# Patient Record
Sex: MALE | Race: WHITE | NOT HISPANIC OR LATINO | Employment: OTHER | URBAN - METROPOLITAN AREA
[De-identification: names, ages, dates, MRNs, and addresses within clinical notes are randomized per-mention and may not be internally consistent; named-entity substitution may affect disease eponyms.]

---

## 2017-03-28 ENCOUNTER — HOSPITAL ENCOUNTER (OUTPATIENT)
Dept: RADIOLOGY | Facility: MEDICAL CENTER | Age: 72
End: 2017-03-28

## 2017-03-28 ENCOUNTER — OFFICE VISIT (OUTPATIENT)
Dept: PULMONOLOGY | Facility: HOSPICE | Age: 72
End: 2017-03-28
Payer: MEDICARE

## 2017-03-28 VITALS
DIASTOLIC BLOOD PRESSURE: 56 MMHG | SYSTOLIC BLOOD PRESSURE: 110 MMHG | RESPIRATION RATE: 13 BRPM | BODY MASS INDEX: 28.58 KG/M2 | WEIGHT: 211 LBS | HEART RATE: 73 BPM | TEMPERATURE: 97.2 F | HEIGHT: 72 IN

## 2017-03-28 DIAGNOSIS — R06.02 SOB (SHORTNESS OF BREATH): ICD-10-CM

## 2017-03-28 PROCEDURE — G8417 CALC BMI ABV UP PARAM F/U: HCPCS | Performed by: NURSE PRACTITIONER

## 2017-03-28 PROCEDURE — 1101F PT FALLS ASSESS-DOCD LE1/YR: CPT | Mod: 8P | Performed by: NURSE PRACTITIONER

## 2017-03-28 PROCEDURE — 99213 OFFICE O/P EST LOW 20 MIN: CPT | Performed by: NURSE PRACTITIONER

## 2017-03-28 PROCEDURE — 1036F TOBACCO NON-USER: CPT | Performed by: NURSE PRACTITIONER

## 2017-03-28 PROCEDURE — G8432 DEP SCR NOT DOC, RNG: HCPCS | Performed by: NURSE PRACTITIONER

## 2017-03-28 PROCEDURE — 4040F PNEUMOC VAC/ADMIN/RCVD: CPT | Mod: 8P | Performed by: NURSE PRACTITIONER

## 2017-03-28 PROCEDURE — G8484 FLU IMMUNIZE NO ADMIN: HCPCS | Performed by: NURSE PRACTITIONER

## 2017-03-28 PROCEDURE — 3017F COLORECTAL CA SCREEN DOC REV: CPT | Mod: 8P | Performed by: NURSE PRACTITIONER

## 2017-03-28 RX ORDER — TERAZOSIN 1 MG/1
1 CAPSULE ORAL NIGHTLY
COMMUNITY
End: 2018-05-02

## 2017-03-28 RX ORDER — CLOPIDOGREL BISULFATE 75 MG/1
75 TABLET ORAL DAILY
COMMUNITY

## 2017-03-28 RX ORDER — ONDANSETRON 4 MG/1
8 TABLET, ORALLY DISINTEGRATING ORAL EVERY 8 HOURS PRN
COMMUNITY

## 2017-03-28 RX ORDER — POTASSIUM CHLORIDE 750 MG/1
10 CAPSULE, EXTENDED RELEASE ORAL 2 TIMES DAILY
COMMUNITY

## 2017-03-28 NOTE — PATIENT INSTRUCTIONS
1.  CTA of chest due to increasing shortness of breath and chest pain.  2. Continue Spiriva daily and pro-air as needed.  3. Patient to follow-up with results.  4. Patient is strongly encouraged to discontinue smoking completely as his pulmonary function tests are still completely normal at this point.

## 2017-03-28 NOTE — PROGRESS NOTES
Chief Complaint   Patient presents with   • Follow-Up     PCP Itzel wants us to investigate his Chest Pain         HPI:  This is a 71 y.o. male with a history of obstructive sleep apnea.  Polysomnogram indicates AHI 15.3 and minimum saturation 85%. The patient is compliant with BiPAP 18/17 cm. Patient reports using his BiPAP nightly for 8-10 hours. He is well rested. He has no problems tolerating the mask or pressure.    Previous pulmonary function tests indicate FEV1 3.19 L, 100% predicted with 10% postbronchodilator response, FEV1/FVC 74%, and DLCO 136% predicted. The patient is compliant with Spiriva daily and pro-air as needed. He's been using his pro-air more often with improvement to his shortness of breath. Patient has a history of tuberculosis and underwent treatment in 1954. He is an every day smoker. He has been on Chantix for all for 2 years. Previous CT from July 2016 showed no acute cardiopulmonary process, however his chest pain and shortness of breath has worsened since then.    Patient was hospitalized October 2016 for shortness of breath and chest pain. The pain was to the right of the sternum and radiated down the right and left arm. Troponins were negative and patient had a negative stress test. Patient is here today to follow up for his chest pain. Primary care's taking a CT may be warranted. Patient's oxygenation is 95%. Patient reports his oxygen saturations are typically 94-98 percent. He has been more short of breath since October. The shortness of breath is consistently worse. The patient had a stent placed in October 2016.    Past Medical History   Diagnosis Date   • Arthritis    • Hypertension    • Congestive heart failure (CMS-HCC)    • Pain      back,legs,arms,pain scale 8   • Dental disorder      dentures   • Snoring    • Psychiatric problem    • Breath shortness    • Pacemaker    • Sleep apnea      uses bypap   • Pneumonia 2004   • Tuberculosis      treated for as a teenager   •  "Diabetes      on oral meds   • Anesthesia      CONFUSED/FIGHTING FOR 10 DAYS AFTER SURGERY   • JONATHAN treated with BiPAP 4/27/2016   • Shortness of breath 8/11/2016       Past Surgical History   Procedure Laterality Date   • Other cardiac surgery  1/22/97     quadruple bypass   • Hip arthroplasty total  10/6/2006     right   • Rotator cuff repair  12/8/06,10/6/07     bilateral,with muscle repair   • Lipoma excision  2005   • Pr repair of jessiertoe,one  2001     right foot   # 2,3,4,5   • Lumbar decompression  1/29/2010     Performed by IRAM PENALOZA at SURGERY [facility]   • Lumbar laminectomy diskectomy  1/29/2010     Performed by IRAM PENALOZA at SURGERY [facility]   • Foraminotomy  1/29/2010     Performed by IRAM PENALOZA at SURGERY [facility]   • Recovery  11/30/2010     Performed by MICHELLE, CATH-RECOVERY at SURGERY SAME DAY [facility]S   • Carpal tunnel release  4/18/2013     Performed by Iram Penaloza M.D. at SURGERY [facility]   • Carpal tunnel release  5/2/2013     Performed by Iram Penaloza M.D. at SURGERY [facility]   • Stent placement  10/28/19       Social History   Substance Use Topics   • Smoking status: Former Smoker -- 3.00 packs/day for 44 years     Types: Cigarettes     Quit date: 01/12/2008   • Smokeless tobacco: Former User     Types: Chew     Quit date: 01/01/1975      Comment: 3 ppd 45 yrs,quit 8/31/08   • Alcohol Use: No       ROS:   Constitutional: Denies fevers, chills, sweats, fatigue, and weight loss.  Eyes: Denies glasses.  Ears/nose/mouth/throat: Denies injury.  Cardiovascular: Denies chest pain, tightness.  Respiratory: Denies shortness of breath, cough, sputum, wheezing, hemoptysis.  GI: Denies heartburn, difficulty swallowing, nausea, and vomiting.  Neurological: Denies frequent headaches, dizziness, weakness.  Sleep: See history of present illness.    Vitals:  Filed Vitals:    03/28/17 0959   Height: 1.829 m (6' 0.01\")   Weight: 95.709 kg (211 lb) "   Weight % change since last entry.: 0 %   BP: 110/56   Pulse: 73   BMI (Calculated): 28.61   Resp: 13   Temp: 36.2 °C (97.2 °F)   TempSrc: Temporal   O2 sat % room air: 95 %     Allergies:  Tape and Valium    Medications.  Current Outpatient Prescriptions   Medication Sig Dispense Refill   • potassium chloride (MICRO-K) 10 MEQ capsule Take 10 mEq by mouth 2 times a day.     • clopidogrel (PLAVIX) 75 MG Tab Take 75 mg by mouth every day.     • terazosin (HYTRIN) 1 MG Cap Take 1 mg by mouth every evening.     • ondansetron (ZOFRAN ODT) 4 MG TABLET DISPERSIBLE Take 4 mg by mouth every 8 hours as needed for Nausea/Vomiting.     • econazole nitrate 1 % cream Apply  to affected area(s) 2 times a day.     • Diclofenac Sodium 1 % Cream Apply  to skin as directed.     • ipratropium (ATROVENT) 0.03 % Solution Spray 2 Sprays in nose 3 times a day. 3 Bottle 1   • isosorbide mononitrate SR (IMDUR) 30 MG TABLET SR 24 HR Take 1 Tab by mouth every day. 30 Tab 2   • metoprolol SR (TOPROL XL) 25 MG TABLET SR 24 HR Take 1 Tab by mouth every day. 30 Tab 2   • dutasteride (AVODART) 0.5 MG capsule Take 0.5 mg by mouth every day.     • fenofibrate (TRICOR) 145 MG Tab Take 145 mg by mouth every bedtime.     • rosuvastatin (CRESTOR) 10 MG Tab Take 10 mg by mouth every evening.     • donepezil (ARICEPT) 10 MG tablet Take 10 mg by mouth every bedtime.     • Memantine HCl ER (NAMENDA XR) 21 MG CAPSULE SR 24 HR Take 21 mg by mouth every bedtime.     • fluoxetine (PROZAC) 20 MG Cap Take 40 mg by mouth every bedtime.     • metformin (GLUCOPHAGE) 500 MG Tab Take 500 mg by mouth 2 times a day, with meals.     • baclofen (LIORESAL) 20 MG tablet Take 20-40 mg by mouth See Admin Instructions. 20 mg in AM  40 mg at BEDTIME     • Buprenorphine (BUTRANS) 10 MCG/HR PATCH WEEKLY Apply 15 mcg to skin as directed every Sunday.     • fluticasone (FLONASE) 50 MCG/ACT nasal spray Spray 1 Spray in nose every day.     • albuterol 108 (90 BASE) MCG/ACT Aero Soln  inhalation aerosol Inhale 2 Puffs by mouth every four hours as needed for Shortness of Breath.     • tiotropium (SPIRIVA HANDIHALER) 18 MCG Cap Inhale 18 mcg by mouth every day.     • omeprazole (PRILOSEC) 20 MG delayed-release capsule Take 20 mg by mouth 2 times a day.     • aspirin EC (ECOTRIN) 81 MG Tablet Delayed Response Take 81 mg by mouth every day.     • B Complex Vitamins (VITAMIN B COMPLEX PO) Take 1 Tab by mouth every day.     • nitroglycerin (NITROSTAT) 0.4 MG SL Tab Place 1 Tab under tongue as needed for Chest Pain (take every 5 minutes x 3 for chest pain, call 911 if not resolved or continued chest pain). 25 Tab 2   • Omega-3 Fatty Acids (FISH OIL) 1000 MG Cap capsule Take 1,000 mg by mouth every day.     • Potassium Gluconate 550 MG Tab Take 1 Tab by mouth every day.       No current facility-administered medications for this visit.       PHYSICAL EXAM:  Appearance: Well-developed, well-nourished, no acute distress.  Eyes. PERRL.  Hearing: Grossly intact.  Oropharynx: Tongue normal, posterior pharynx without erythema or exudate.  Respiratory effort: No intercostal retractions or use of accessory muscles.  Lung auscultation: No crackles, wheezing.  Heart auscultation: No murmur, gallop, or rub. Regular rate and rhythm.  Extremities: No cyanosis or edema.  Gait and Station: Normal  Orientation: Oriented to time, place, and person.    Assessment:  1. SOB (shortness of breath)  CT-CTA CHEST PULMONARY ARTERY W/ RECONS    BUN+CREAT         Plan:  1.  CTA of chest due to increasing shortness of breath and chest pain. Currently oxygenation is adequate and the patient is not having active chest pain at this moment.  2. Continue Spiriva daily and pro-air as needed.  3. Patient to follow-up with results.  4. Patient is strongly encouraged to discontinue smoking completely as his pulmonary function tests are still completely normal at this point.    Return in about 3 weeks (around 4/18/2017) for With results,  With VIRGIL Chavez.

## 2017-03-28 NOTE — MR AVS SNAPSHOT
"        Al Gillis   3/28/2017 10:00 AM   Office Visit   MRN: [id number]    Department:  [facility]   Dept Phone:  [phone]    Description:  Male : 1945   Provider:  RAYMUNDO Francis           Reason for Visit     Follow-Up PCP Itzel wants us to investigate his Chest Pain      Allergies as of 3/28/2017     Allergen Noted Reactions    Tape 2010       Paper tape ok,coban ok    Valium 2008       Psychological changes,'history of addiction to valium'      You were diagnosed with     SOB (shortness of breath)   [549625]         Vital Signs     Blood Pressure Pulse Temperature Respirations Height Weight    110/56 mmHg 73 36.2 °C (97.2 °F) (Temporal) 13 1.829 m (6' 0.01\") 95.709 kg (211 lb)    Body Mass Index Smoking Status                28.61 kg/m2 Former Smoker          Basic Information     Date Of Birth Sex Race Ethnicity Preferred Language    1945 Male White Non- English      Your appointments     2017  8:00 AM   Established Patient Pul with RAYMUNDO Francis   [facility] (--)    [address]  [address]  [place] [address] [phone]              Problem List              ICD-10-CM Priority Class Noted - Resolved    Cardiac pacemaker in situ (Chronic) Z95.0 Low  2011 - Present    S/P CABG x 4 (Chronic) Z95.1   2011 - Present    Essential hypertension, benign (Chronic) I10 Low  2011 - Present    Dyslipidemia (Chronic) E78.5 Low  2011 - Present    Carpal tunnel syndrome G56.00   2013 - Present    JONATHAN treated with BiPAP (Chronic) G47.33   2016 - Present    Shortness of breath R06.02   2016 - Present    Alzheimer's disease G30.9   10/11/2016 - Present    History of COPD (Chronic) Z87.09 Low  10/11/2016 - Present      Health Maintenance        Date Due Completion Dates    IMM DTaP/Tdap/Td Vaccine (1 - Tdap) 7/10/1963 ---    COLONOSCOPY 7/10/1994 ---    IMM ZOSTER VACCINE 7/10/2004 ---   " IMM PNEUMOCOCCAL 65+ (ADULT) LOW/MEDIUM RISK SERIES (1 of 2 - PCV13) 7/10/2009 ---    IMM INFLUENZA (1) 9/1/2016 ---            Current Immunizations     No immunizations on file.      Below and/or attached are the medications your provider expects you to take. Review all of your home medications and newly ordered medications with your provider and/or pharmacist. Follow medication instructions as directed by your provider and/or pharmacist. Please keep your medication list with you and share with your provider. Update the information when medications are discontinued, doses are changed, or new medications (including over-the-counter products) are added; and carry medication information at all times in the event of emergency situations     Allergies:  TAPE - (reactions not documented)     VALIUM - (reactions not documented)               Medications  Valid as of: March 28, 2017 - 10:50 AM    Generic Name Brand Name Tablet Size Instructions for use    Albuterol Sulfate (Aero Soln) albuterol 108 (90 BASE) MCG/ACT Inhale 2 Puffs by mouth every four hours as needed for Shortness of Breath.        Aspirin (Tablet Delayed Response) ECOTRIN 81 MG Take 81 mg by mouth every day.        B Complex Vitamins   Take 1 Tab by mouth every day.        Baclofen (Tab) LIORESAL 20 MG Take 20-40 mg by mouth See Admin Instructions. 20 mg in AM  40 mg at BEDTIME        Buprenorphine (PATCH WEEKLY) Buprenorphine 10 MCG/HR Apply 15 mcg to skin as directed every Sunday.        Clopidogrel Bisulfate (Tab) PLAVIX 75 MG Take 75 mg by mouth every day.        Diclofenac Sodium (Cream) Diclofenac Sodium 1 % Apply  to skin as directed.        Donepezil HCl (Tab) ARICEPT 10 MG Take 10 mg by mouth every bedtime.        Dutasteride (Cap) AVODART 0.5 MG Take 0.5 mg by mouth every day.        Econazole Nitrate (Cream) econazole nitrate 1 % Apply  to affected area(s) 2 times a day.        Fenofibrate (Tab) TRICOR 145 MG Take 145 mg by mouth every bedtime.         FLUoxetine HCl (Cap) PROZAC 20 MG Take 40 mg by mouth every bedtime.        Fluticasone Propionate (Suspension) FLONASE 50 MCG/ACT Spray 1 Spray in nose every day.        Ipratropium Bromide (Solution) ATROVENT 0.03 % Spray 2 Sprays in nose 3 times a day.        Isosorbide Mononitrate (TABLET SR 24 HR) IMDUR 30 MG Take 1 Tab by mouth every day.        Memantine HCl (CAPSULE SR 24 HR) Memantine HCl ER 21 MG Take 21 mg by mouth every bedtime.        MetFORMIN HCl (Tab) GLUCOPHAGE 500 MG Take 500 mg by mouth 2 times a day, with meals.        Metoprolol Succinate (TABLET SR 24 HR) TOPROL XL 25 MG Take 1 Tab by mouth every day.        Nitroglycerin (SL Tab) NITROSTAT 0.4 MG Place 1 Tab under tongue as needed for Chest Pain (take every 5 minutes x 3 for chest pain, call 911 if not resolved or continued chest pain).        Omega-3 Fatty Acids (Cap) fish oil 1000 MG Take 1,000 mg by mouth every day.        Omeprazole (CAPSULE DELAYED RELEASE) PRILOSEC 20 MG Take 20 mg by mouth 2 times a day.        Ondansetron (TABLET DISPERSIBLE) ZOFRAN ODT 4 MG Take 4 mg by mouth every 8 hours as needed for Nausea/Vomiting.        Potassium Chloride (Cap CR) MICRO-K 10 MEQ Take 10 mEq by mouth 2 times a day.        Potassium Gluconate (Tab) Potassium Gluconate 550 MG Take 1 Tab by mouth every day.        Rosuvastatin Calcium (Tab) CRESTOR 10 MG Take 10 mg by mouth every evening.        Terazosin HCl (Cap) HYTRIN 1 MG Take 1 mg by mouth every evening.        Tiotropium Bromide Monohydrate (Cap) SPIRIVA 18 MCG Inhale 18 mcg by mouth every day.        .                 Medicines prescribed today were sent to:     [facility] - [place], [place] - [address] AT PORTAL TO REGISTERED [facility] SITES    [address] [facility] [address]    Phone: [phone] Fax: [phone]    Open 24 Hours?: No    [facility] [place] [address] - [facility]    [address] [place] [address]    Phone:  [phone] Fax: [phone]    Open 24 Hours?: No      Medication refill instructions:       If your prescription bottle indicates you have medication refills left, it is not necessary to call your provider’s office. Please contact your pharmacy and they will refill your medication.    If your prescription bottle indicates you do not have any refills left, you may request refills at any time through one of the following ways: The online [employer] system (except Urgent Care), by calling your provider’s office, or by asking your pharmacy to contact your provider’s office with a refill request. Medication refills are processed only during regular business hours and may not be available until the next business day. Your provider may request additional information or to have a follow-up visit with you prior to refilling your medication.   *Please Note: Medication refills are assigned a new Rx number when refilled electronically. Your pharmacy may indicate that no refills were authorized even though a new prescription for the same medication is available at the pharmacy. Please request the medicine by name with the pharmacy before contacting your provider for a refill.        Your To Do List     Future Labs/Procedures Complete By Expires    CT-CTA CHEST PULMONARY ARTERY W/ RECONS  As directed 3/28/2018      Instructions    1.  CTA of chest due to increasing shortness of breath and chest pain.  2. Continue Spiriva daily and pro-air as needed.  3. Patient to follow-up with results.  4. Patient is strongly encouraged to discontinue smoking completely as his pulmonary function tests are still completely normal at this point.              [employer] Access Code: [email or web address]  Expires: 4/23/2017 10:48 AM    [employer]  A secure, online tool to manage your health information     [email or web address]place® is a secure, online tool that connects you to your personalized health information from the privacy of your home -- day or  night - making it very easy for you to manage your healthcare. Once the activation process is completed, you can even access your medical information using the Kitchenbug vianca, which is available for free in the [employer] Vianca store or Google Play store.     Kitchenbug provides the following levels of access (as shown below):   My Chart Features   [facility] Doctor [facility]  Specialists [facility]  Urgent  Care Non-[facility]  Primary Care  Doctor   Email your healthcare team securely and privately 24/7 X X X    Manage appointments: schedule your next appointment; view details of past/upcoming appointments X      Request prescription refills. X      View recent personal medical records, including lab and immunizations X X X X   View health record, including health history, allergies, medications X X X X   Read reports about your outpatient visits, procedures, consult and ER notes X X X X   See your discharge summary, which is a recap of your hospital and/or ER visit that includes your diagnosis, lab results, and care plan. X X       How to register for [employer]:  1. Go to  [email or web address].  2. Click on the Sign Up Now box, which takes you to the New Member Sign Up page. You will need to provide the following information:  a. Enter your [employer] Access Code exactly as it appears at the top of this page. (You will not need to use this code after you’ve completed the sign-up process. If you do not sign up before the expiration date, you must request a new code.)   b. Enter your date of birth.   c. Enter your home email address.   d. Click Submit, and follow the next screen’s instructions.  3. Create a Kitchenbug ID. This will be your Kitchenbug login ID and cannot be changed, so think of one that is secure and easy to remember.  4. Create a Kitchenbug password. You can change your password at any time.  5. Enter your Password Reset Question and Answer. This can be used at a later time if you forget your password.   6. Enter your  e-mail address. This allows you to receive e-mail notifications when new information is available in [employer].  7. Click Sign Up. You can now view your health information.    For assistance activating your [employer] account, call [phone]

## 2017-04-19 ENCOUNTER — OFFICE VISIT (OUTPATIENT)
Dept: PULMONOLOGY | Facility: HOSPICE | Age: 72
End: 2017-04-19
Payer: MEDICARE

## 2017-04-19 VITALS
SYSTOLIC BLOOD PRESSURE: 98 MMHG | HEART RATE: 71 BPM | HEIGHT: 72 IN | OXYGEN SATURATION: 96 % | DIASTOLIC BLOOD PRESSURE: 50 MMHG | BODY MASS INDEX: 29.12 KG/M2 | WEIGHT: 215 LBS | RESPIRATION RATE: 16 BRPM | TEMPERATURE: 98.6 F

## 2017-04-19 DIAGNOSIS — G47.33 OSA TREATED WITH BIPAP: Chronic | ICD-10-CM

## 2017-04-19 DIAGNOSIS — R06.02 SHORTNESS OF BREATH: ICD-10-CM

## 2017-04-19 PROCEDURE — 1036F TOBACCO NON-USER: CPT | Performed by: NURSE PRACTITIONER

## 2017-04-19 PROCEDURE — G8432 DEP SCR NOT DOC, RNG: HCPCS | Performed by: NURSE PRACTITIONER

## 2017-04-19 PROCEDURE — G8417 CALC BMI ABV UP PARAM F/U: HCPCS | Performed by: NURSE PRACTITIONER

## 2017-04-19 PROCEDURE — 3017F COLORECTAL CA SCREEN DOC REV: CPT | Mod: 8P | Performed by: NURSE PRACTITIONER

## 2017-04-19 PROCEDURE — 1101F PT FALLS ASSESS-DOCD LE1/YR: CPT | Mod: 8P | Performed by: NURSE PRACTITIONER

## 2017-04-19 PROCEDURE — 4040F PNEUMOC VAC/ADMIN/RCVD: CPT | Mod: 8P | Performed by: NURSE PRACTITIONER

## 2017-04-19 PROCEDURE — 99213 OFFICE O/P EST LOW 20 MIN: CPT | Performed by: NURSE PRACTITIONER

## 2017-04-19 RX ORDER — TIOTROPIUM BROMIDE 18 UG/1
18 CAPSULE ORAL; RESPIRATORY (INHALATION) DAILY
Qty: 90 CAP | Refills: 3 | Status: SHIPPED | OUTPATIENT
Start: 2017-04-19 | End: 2017-08-23

## 2017-04-19 RX ORDER — ALBUTEROL SULFATE 90 UG/1
2 AEROSOL, METERED RESPIRATORY (INHALATION) EVERY 4 HOURS PRN
Qty: 3 INHALER | Refills: 3 | Status: SHIPPED | OUTPATIENT
Start: 2017-04-19 | End: 2017-12-26 | Stop reason: SDUPTHER

## 2017-04-19 NOTE — PROGRESS NOTES
Chief Complaint   Patient presents with   • COPD     Ct Results         HPI:  This is a 71 y.o. male with a history of shortness of breath and obstructive sleep apnea. Pulmonary function tests from 8/11/16 indicated FEV1 3.19 L, 100% predicted, FEV1/FVC 74%, FEF 25-75% 96% predicted with positive bronchodilator response, and DLCO 136% predicted. The patient is compliant with Spiriva daily and prior as needed. The patient has a history of tuberculosis and underwent treatment in 1954. He has been on Chantix for 2 years. He continues to smoke. Due to increase in shortness of breath a CTA was obtained 3/28/17 indicating no pulmonary embolus with bibasilar scarring however otherwise clear lungs. There is a stable benign-appearing cyst in the anterior mediastinum. The patient reports that his shortness of breath has improved since his last visit in March. He has been more active and this is helping. He is encouraged to walk daily. He denies fevers, chills, sweats, hemoptysis. He has occasional wheezing.    Polysomnogram indicates AHI 15.3 and minimum saturation 85%. The patient is compliant with BiPAP 18/17 cm. Compliance dated 3/20-4/18/17 indicates 100% compliance for 8 hours 9 minutes. AHI is reduced to 6.0. There is significant leakage. The patient feels rested when he wakes up. He denies early morning headaches. He does not nap in the daytime. He typically wakes up between 1-2 times per night.      Past Medical History   Diagnosis Date   • Arthritis    • Hypertension    • Congestive heart failure (CMS-HCC)    • Pain      back,legs,arms,pain scale 8   • Dental disorder      dentures   • Snoring    • Psychiatric problem    • Breath shortness    • Pacemaker    • Sleep apnea      uses bypap   • Pneumonia 2004   • Tuberculosis      treated for as a teenager   • Diabetes      on oral meds   • Anesthesia      CONFUSED/FIGHTING FOR 10 DAYS AFTER SURGERY   • JONATHAN treated with BiPAP 4/27/2016   • Shortness of breath 8/11/2016  "      Past Surgical History   Procedure Laterality Date   • Other cardiac surgery  1/22/97     quadruple bypass   • Hip arthroplasty total  10/6/2006     right   • Rotator cuff repair  12/8/06,10/6/07     bilateral,with muscle repair   • Lipoma excision  2005   • Pr repair of hammertoe,one  2001     right foot   # 2,3,4,5   • Lumbar decompression  1/29/2010     Performed by LEO PENALOZA at SURGERY [facility] ORS   • Lumbar laminectomy diskectomy  1/29/2010     Performed by LEO PENALOZA at SURGERY [facility] ORS   • Foraminotomy  1/29/2010     Performed by LEO PENALOZA at SURGERY [facility] ORS   • Recovery  11/30/2010     Performed by SURGERY, [facility] at SURGERY SAME DAY [facility]   • Carpal tunnel release  4/18/2013     Performed by Leo Penaloza M.D. at SURGERY [facility] ORS   • Carpal tunnel release  5/2/2013     Performed by Leo Penaloza M.D. at SURGERY [facility] ORS   • Stent placement  10/28/19       Social History   Substance Use Topics   • Smoking status: Former Smoker -- 3.00 packs/day for 44 years     Types: Cigarettes     Quit date: 01/12/2008   • Smokeless tobacco: Former User     Types: Chew     Quit date: 01/01/1975      Comment: 3 ppd 45 yrs,quit 8/31/08   • Alcohol Use: No       ROS:   Constitutional: Denies fevers, chills, sweats, fatigue, and weight loss.  Eyes: Glasses.  Ears/nose/mouth/throat: Denies injury.  Cardiovascular: Denies chest pain, tightness.  Respiratory: See history of present illness.  GI: Denies heartburn, difficulty swallowing, nausea, and vomiting.  Neurological: Denies frequent headaches, dizziness, weakness.    Vitals:  Filed Vitals:    04/19/17 0759   Height: 1.829 m (6' 0.01\")   Weight: 97.523 kg (215 lb)   Weight % change since last entry.: 0 %   BP: 98/50   Pulse: 71   BMI (Calculated): 29.15   Resp: 16   Temp: 37 °C (98.6 °F)   O2 sat % room air: 96 %       Allergies:  Tape and Valium    Medications:  Current Outpatient Prescriptions "   Medication Sig Dispense Refill   • albuterol 108 (90 BASE) MCG/ACT Aero Soln inhalation aerosol Inhale 2 Puffs by mouth every four hours as needed for Shortness of Breath. 3 Inhaler 3   • tiotropium (SPIRIVA HANDIHALER) 18 MCG Cap Inhale 1 Cap by mouth every day. 90 Cap 3   • potassium chloride (MICRO-K) 10 MEQ capsule Take 10 mEq by mouth 2 times a day.     • clopidogrel (PLAVIX) 75 MG Tab Take 75 mg by mouth every day.     • terazosin (HYTRIN) 1 MG Cap Take 1 mg by mouth every evening.     • ondansetron (ZOFRAN ODT) 4 MG TABLET DISPERSIBLE Take 4 mg by mouth every 8 hours as needed for Nausea/Vomiting.     • econazole nitrate 1 % cream Apply  to affected area(s) 2 times a day.     • Diclofenac Sodium 1 % Cream Apply  to skin as directed.     • ipratropium (ATROVENT) 0.03 % Solution Spray 2 Sprays in nose 3 times a day. 3 Bottle 1   • isosorbide mononitrate SR (IMDUR) 30 MG TABLET SR 24 HR Take 1 Tab by mouth every day. 30 Tab 2   • metoprolol SR (TOPROL XL) 25 MG TABLET SR 24 HR Take 1 Tab by mouth every day. 30 Tab 2   • nitroglycerin (NITROSTAT) 0.4 MG SL Tab Place 1 Tab under tongue as needed for Chest Pain (take every 5 minutes x 3 for chest pain, call 911 if not resolved or continued chest pain). 25 Tab 2   • dutasteride (AVODART) 0.5 MG capsule Take 0.5 mg by mouth every day.     • fenofibrate (TRICOR) 145 MG Tab Take 145 mg by mouth every bedtime.     • rosuvastatin (CRESTOR) 10 MG Tab Take 10 mg by mouth every evening.     • donepezil (ARICEPT) 10 MG tablet Take 10 mg by mouth every bedtime.     • Memantine HCl ER (NAMENDA XR) 21 MG CAPSULE SR 24 HR Take 21 mg by mouth every bedtime.     • fluoxetine (PROZAC) 20 MG Cap Take 40 mg by mouth every bedtime.     • metformin (GLUCOPHAGE) 500 MG Tab Take 500 mg by mouth 2 times a day, with meals.     • baclofen (LIORESAL) 20 MG tablet Take 20-40 mg by mouth See Admin Instructions. 20 mg in AM  40 mg at BEDTIME     • Buprenorphine (BUTRANS) 10 MCG/HR PATCH  WEEKLY Apply 15 mcg to skin as directed every Sunday.     • fluticasone (FLONASE) 50 MCG/ACT nasal spray Spray 1 Spray in nose every day.     • omeprazole (PRILOSEC) 20 MG delayed-release capsule Take 20 mg by mouth 2 times a day.     • Omega-3 Fatty Acids (FISH OIL) 1000 MG Cap capsule Take 1,000 mg by mouth every day.     • aspirin EC (ECOTRIN) 81 MG Tablet Delayed Response Take 81 mg by mouth every day.     • B Complex Vitamins (VITAMIN B COMPLEX PO) Take 1 Tab by mouth every day.     • Potassium Gluconate 550 MG Tab Take 1 Tab by mouth every day.       No current facility-administered medications for this visit.       PHYSICAL EXAM:  Appearance: Well-developed, well-nourished, no acute distress.  Eyes. PERRL.  Hearing: Grossly intact.  Oropharynx: Tongue normal, posterior pharynx without erythema or exudate.  Respiratory effort: No intercostal retractions or use of accessory muscles.  Lung auscultation: No crackles, wheezing.  Heart auscultation: No gallop, or rub. Regular rate and rhythm. 2/6 murmur.  Extremities: No cyanosis or edema.  Gait and Station: Ambulates with walker.  Orientation: Oriented to time, place, and person.    Assessment:  1. JONATHAN treated with BiPAP  DME MASK AND SUPPLIES   2. Shortness of breath  albuterol 108 (90 BASE) MCG/ACT Aero Soln inhalation aerosol    tiotropium (SPIRIVA HANDIHALER) 18 MCG Cap         Plan:  1. Continue Spiriva daily and pro-air as needed.  2. Continue BiPAP/17 cm.  3. Mask and supplies with mask fit to CPAP and more.  4. Clean equipment weekly and replace supplies as allowed by insurance.  5. Daily exercise encouraged.     Return in about 6 months (around 10/19/2017) for With VIRGIL Chavez.

## 2017-04-19 NOTE — PATIENT INSTRUCTIONS
1. Continue Spiriva daily and pro-air as needed.  2. Continue BiPAP/17 cm.  3. Mask and supplies with mask fit to CPAP and more.  4. Clean equipment weekly and replace supplies as allowed by insurance.  5. Daily exercise encouraged.

## 2017-04-19 NOTE — MR AVS SNAPSHOT
"        Al Gillis   2017 8:00 AM   Office Visit   MRN: [id number]    Department:  [facility]   Dept Phone:  [phone]    Description:  Male : 1945   Provider:  RAYMUNDO Francis           Reason for Visit     COPD Ct Results      Allergies as of 2017     Allergen Noted Reactions    Tape 2010       Paper tape ok,coban ok    Valium 2008       Psychological changes,'history of addiction to valium'      You were diagnosed with     JONATHAN treated with BiPAP   [[id number]]       Shortness of breath   [786.05.ICD-9-CM]         Vital Signs     Blood Pressure Pulse Temperature Respirations Height Weight    98/50 mmHg 71 37 °C (98.6 °F) 16 1.829 m (6' 0.01\") 97.523 kg (215 lb)    Body Mass Index Oxygen Saturation Smoking Status             29.15 kg/m2 96% Former Smoker         Basic Information     Date Of Birth Sex Race Ethnicity Preferred Language    1945 Male White Non- English      Your appointments     Oct 24, 2017 10:00 AM   Established Patient Pul with RAYMUNDO Francis   [facility] (--)    [address]  [address]  [facility] [address] [phone]              Problem List              ICD-10-CM Priority Class Noted - Resolved    Cardiac pacemaker in situ (Chronic) Z95.0 Low  2011 - Present    S/P CABG x 4 (Chronic) Z95.1   2011 - Present    Essential hypertension, benign (Chronic) I10 Low  2011 - Present    Dyslipidemia (Chronic) E78.5 Low  2011 - Present    Carpal tunnel syndrome G56.00   2013 - Present    JONATHAN treated with BiPAP (Chronic) G47.33   2016 - Present    Shortness of breath R06.02   2016 - Present    Alzheimer's disease G30.9   10/11/2016 - Present    History of COPD (Chronic) Z87.09 Low  10/11/2016 - Present      Health Maintenance        Date Due Completion Dates    IMM DTaP/Tdap/Td Vaccine (1 - Tdap) 7/10/1963 ---    COLONOSCOPY 7/10/1994 ---    IMM ZOSTER VACCINE 7/10/2004 --- "    IMM PNEUMOCOCCAL 65+ (ADULT) LOW/MEDIUM RISK SERIES (1 of 2 - PCV13) 7/10/2009 ---            Current Immunizations     No immunizations on file.      Below and/or attached are the medications your provider expects you to take. Review all of your home medications and newly ordered medications with your provider and/or pharmacist. Follow medication instructions as directed by your provider and/or pharmacist. Please keep your medication list with you and share with your provider. Update the information when medications are discontinued, doses are changed, or new medications (including over-the-counter products) are added; and carry medication information at all times in the event of emergency situations     Allergies:  TAPE - (reactions not documented)     VALIUM - (reactions not documented)               Medications  Valid as of: April 19, 2017 -  8:30 AM    Generic Name Brand Name Tablet Size Instructions for use    Albuterol Sulfate (Aero Soln) albuterol 108 (90 BASE) MCG/ACT Inhale 2 Puffs by mouth every four hours as needed for Shortness of Breath.        Aspirin (Tablet Delayed Response) ECOTRIN 81 MG Take 81 mg by mouth every day.        B Complex Vitamins   Take 1 Tab by mouth every day.        Baclofen (Tab) LIORESAL 20 MG Take 20-40 mg by mouth See Admin Instructions. 20 mg in AM  40 mg at BEDTIME        Buprenorphine (PATCH WEEKLY) Buprenorphine 10 MCG/HR Apply 15 mcg to skin as directed every Sunday.        Clopidogrel Bisulfate (Tab) PLAVIX 75 MG Take 75 mg by mouth every day.        Diclofenac Sodium (Cream) Diclofenac Sodium 1 % Apply  to skin as directed.        Donepezil HCl (Tab) ARICEPT 10 MG Take 10 mg by mouth every bedtime.        Dutasteride (Cap) AVODART 0.5 MG Take 0.5 mg by mouth every day.        Econazole Nitrate (Cream) econazole nitrate 1 % Apply  to affected area(s) 2 times a day.        Fenofibrate (Tab) TRICOR 145 MG Take 145 mg by mouth every bedtime.        FLUoxetine HCl (Cap)  PROZAC 20 MG Take 40 mg by mouth every bedtime.        Fluticasone Propionate (Suspension) FLONASE 50 MCG/ACT Spray 1 Spray in nose every day.        Ipratropium Bromide (Solution) ATROVENT 0.03 % Spray 2 Sprays in nose 3 times a day.        Isosorbide Mononitrate (TABLET SR 24 HR) IMDUR 30 MG Take 1 Tab by mouth every day.        Memantine HCl (CAPSULE SR 24 HR) Memantine HCl ER 21 MG Take 21 mg by mouth every bedtime.        MetFORMIN HCl (Tab) GLUCOPHAGE 500 MG Take 500 mg by mouth 2 times a day, with meals.        Metoprolol Succinate (TABLET SR 24 HR) TOPROL XL 25 MG Take 1 Tab by mouth every day.        Nitroglycerin (SL Tab) NITROSTAT 0.4 MG Place 1 Tab under tongue as needed for Chest Pain (take every 5 minutes x 3 for chest pain, call 911 if not resolved or continued chest pain).        Omega-3 Fatty Acids (Cap) fish oil 1000 MG Take 1,000 mg by mouth every day.        Omeprazole (CAPSULE DELAYED RELEASE) PRILOSEC 20 MG Take 20 mg by mouth 2 times a day.        Ondansetron (TABLET DISPERSIBLE) ZOFRAN ODT 4 MG Take 4 mg by mouth every 8 hours as needed for Nausea/Vomiting.        Potassium Chloride (Cap CR) MICRO-K 10 MEQ Take 10 mEq by mouth 2 times a day.        Potassium Gluconate (Tab) Potassium Gluconate 550 MG Take 1 Tab by mouth every day.        Rosuvastatin Calcium (Tab) CRESTOR 10 MG Take 10 mg by mouth every evening.        Terazosin HCl (Cap) HYTRIN 1 MG Take 1 mg by mouth every evening.        Tiotropium Bromide Monohydrate (Cap) SPIRIVA 18 MCG Inhale 1 Cap by mouth every day.        .                 Medicines prescribed today were sent to:     [facility] - [address]    [address] [facility] [address] [facility] [address]    Phone: [phone] Fax: [phone]    Open 24 Hours?: No    [facility] [place] [address] - [address] [place] [address]    [address] [place] [address]    Phone: [phone] Fax: [phone]    Open 24 Hours?: No         Medication refill instructions:       If your prescription bottle indicates you have medication refills left, it is not necessary to call your provider’s office. Please contact your pharmacy and they will refill your medication.    If your prescription bottle indicates you do not have any refills left, you may request refills at any time through one of the following ways: The online [employer] system (except Urgent Care), by calling your provider’s office, or by asking your pharmacy to contact your provider’s office with a refill request. Medication refills are processed only during regular business hours and may not be available until the next business day. Your provider may request additional information or to have a follow-up visit with you prior to refilling your medication.   *Please Note: Medication refills are assigned a new Rx number when refilled electronically. Your pharmacy may indicate that no refills were authorized even though a new prescription for the same medication is available at the pharmacy. Please request the medicine by name with the pharmacy before contacting your provider for a refill.        Instructions    1. Continue Spiriva daily and pro-air as needed.  2. Continue BiPAP/17 cm.  3. Mask and supplies with mask fit to CPAP and more.  4. Clean equipment weekly and replace supplies as allowed by insurance.  5. Daily exercise encouraged.               [employer] Access Code: [email or web address]  Expires: 4/23/2017 10:48 AM    [employer]  A secure, online tool to manage your health information     [employer]’s [employer]® is a secure, online tool that connects you to your personalized health information from the privacy of your home -- day or night - making it very easy for you to manage your healthcare. Once the activation process is completed, you can even access your medical information using the [employer] vianca, which is available for free in the [employer] store or Google Play store.     [employer]  provides the following levels of access (as shown below):   My Chart Features   [facility] Doctor [facility]  Specialists [facility]  Urgent  Care Non-[facility]  Primary Care  Doctor   Email your healthcare team securely and privately 24/7 X X X    Manage appointments: schedule your next appointment; view details of past/upcoming appointments X      Request prescription refills. X      View recent personal medical records, including lab and immunizations X X X X   View health record, including health history, allergies, medications X X X X   Read reports about your outpatient visits, procedures, consult and ER notes X X X X   See your discharge summary, which is a recap of your hospital and/or ER visit that includes your diagnosis, lab results, and care plan. X X       How to register for [employer]  1. Go to  [email or web address].  2. Click on the Sign Up Now box, which takes you to the New Member Sign Up page. You will need to provide the following information:  a. Enter your [employer] Access Code exactly as it appears at the top of this page. (You will not need to use this code after you’ve completed the sign-up process. If you do not sign up before the expiration date, you must request a new code.)   b. Enter your date of birth.   c. Enter your home email address.   d. Click Submit, and follow the next screen’s instructions.  3. Create a [employer] ID. This will be your [employer] login ID and cannot be changed, so think of one that is secure and easy to remember.  4. Create a [employer] password. You can change your password at any time.  5. Enter your Password Reset Question and Answer. This can be used at a later time if you forget your password.   6. Enter your e-mail address. This allows you to receive e-mail notifications when new information is available in [employer]..  7. Click Sign Up. You can now view your health information.    For assistance activating your [employer] account, call [phone]

## 2017-06-20 ENCOUNTER — TELEPHONE (OUTPATIENT)
Dept: PULMONOLOGY | Facility: HOSPICE | Age: 72
End: 2017-06-20

## 2017-06-20 NOTE — TELEPHONE ENCOUNTER
LVM for pt to call me back. Per Atiya last note we are supposed to see hi, back in October 2017. He has a FU scheduled for 6/27. Calling to inquire if there is something I can help him with over the phone or if this apt was made by mistake.

## 2017-06-20 NOTE — TELEPHONE ENCOUNTER
pts wife called back. She states Al has been having mask and pressure issues. She states he's been loosing weight and not sleeping well. I switched him apt to WILFREDO engel with Dr Lee 6/26 at the sleep center. Pts wife was please with this

## 2017-06-26 ENCOUNTER — SLEEP CENTER VISIT (OUTPATIENT)
Dept: SLEEP MEDICINE | Facility: MEDICAL CENTER | Age: 72
End: 2017-06-26
Payer: MEDICARE

## 2017-06-26 VITALS
TEMPERATURE: 99 F | SYSTOLIC BLOOD PRESSURE: 100 MMHG | HEART RATE: 68 BPM | OXYGEN SATURATION: 96 % | RESPIRATION RATE: 18 BRPM | DIASTOLIC BLOOD PRESSURE: 68 MMHG

## 2017-06-26 DIAGNOSIS — Z95.1 S/P CABG X 4: Chronic | ICD-10-CM

## 2017-06-26 DIAGNOSIS — G47.33 OSA TREATED WITH BIPAP: Chronic | ICD-10-CM

## 2017-06-26 PROCEDURE — 99214 OFFICE O/P EST MOD 30 MIN: CPT | Performed by: INTERNAL MEDICINE

## 2017-06-26 RX ORDER — GABAPENTIN 300 MG/1
CAPSULE ORAL
Refills: 0 | COMMUNITY
Start: 2017-06-06

## 2017-06-26 RX ORDER — BUPRENORPHINE 15 UG/H
PATCH, EXTENDED RELEASE TRANSDERMAL
Refills: 0 | COMMUNITY
Start: 2017-05-30 | End: 2018-05-02

## 2017-06-26 RX ORDER — ZOLPIDEM TARTRATE 5 MG/1
TABLET ORAL
Qty: 3 TAB | Refills: 0 | Status: SHIPPED | OUTPATIENT
Start: 2017-06-26 | End: 2018-05-02

## 2017-06-26 NOTE — MR AVS SNAPSHOT
Al Gillis   2017 10:00 AM   Sleep Center Visit   MRN: [id number]    Department:  [facility]   Dept Phone:  [phone]    Description:  Male : 1945   Provider:  Horacio Lee M.D.           Reason for Visit     Apnea C/O pressure too high      Allergies as of 2017     Allergen Noted Reactions    Tape 2010       Paper tape ok,coban ok    Valium 2008       Psychological changes,'history of addiction to valium'      You were diagnosed with     JONATHAN treated with BiPAP   [[id number]]       S/P CABG x 4   [[id number]]         Vital Signs     Blood Pressure Pulse Temperature Respirations Oxygen Saturation Smoking Status    100/68 mmHg 68 37.2 °C (99 °F) 18 96% Former Smoker      Basic Information     Date Of Birth Sex Race Ethnicity Preferred Language    1945 Male White Non- English      Your appointments     Aug 16, 2017  8:10 PM   Sleep Study with SLEEP TECH   [facility] (--)    [address]  [place] [id number] [phone]            Aug 23, 2017 10:40 AM   Follow UP with ALECIA Elizabeth   [facility] (--)    [address]  [place] [id number] [phone]              Problem List              ICD-10-CM Priority Class Noted - Resolved    Cardiac pacemaker in situ (Chronic) Z95.0 Low  2011 - Present    S/P CABG x 4 (Chronic) Z95.1   2011 - Present    Essential hypertension, benign (Chronic) I10 Low  2011 - Present    Dyslipidemia (Chronic) E78.5 Low  2011 - Present    Carpal tunnel syndrome G56.00   2013 - Present    JONATHAN treated with BiPAP (Chronic) G47.33   2016 - Present    Shortness of breath R06.02   2016 - Present    Alzheimer's disease G30.9   10/11/2016 - Present    History of COPD (Chronic) Z87.09 Low  10/11/2016 - Present      Health Maintenance        Date Due Completion Dates    IMM DTaP/Tdap/Td Vaccine (1 - Tdap)  7/10/1963 ---    COLONOSCOPY 7/10/1994 ---    IMM ZOSTER VACCINE 7/10/2004 ---    IMM PNEUMOCOCCAL 65+ (ADULT) LOW/MEDIUM RISK SERIES (1 of 2 - PCV13) 7/10/2009 ---            Current Immunizations     No immunizations on file.      Below and/or attached are the medications your provider expects you to take. Review all of your home medications and newly ordered medications with your provider and/or pharmacist. Follow medication instructions as directed by your provider and/or pharmacist. Please keep your medication list with you and share with your provider. Update the information when medications are discontinued, doses are changed, or new medications (including over-the-counter products) are added; and carry medication information at all times in the event of emergency situations     Allergies:  TAPE - (reactions not documented)     VALIUM - (reactions not documented)               Medications  Valid as of: June 26, 2017 - 10:35 AM    Generic Name Brand Name Tablet Size Instructions for use    Albuterol Sulfate (Aero Soln) albuterol 108 (90 BASE) MCG/ACT Inhale 2 Puffs by mouth every four hours as needed for Shortness of Breath.        Aspirin (Tablet Delayed Response) ECOTRIN 81 MG Take 81 mg by mouth every day.        B Complex Vitamins   Take 1 Tab by mouth every day.        Baclofen (Tab) LIORESAL 20 MG Take 20-40 mg by mouth See Admin Instructions. 20 mg in AM  40 mg at BEDTIME        Buprenorphine (PATCH WEEKLY) Buprenorphine 10 MCG/HR Apply 15 mcg to skin as directed every Sunday.        Buprenorphine (PATCH WEEKLY) BUTRANS 15 MCG/HR every 7 days.        Clopidogrel Bisulfate (Tab) PLAVIX 75 MG Take 75 mg by mouth every day.        Diclofenac Sodium (Cream) Diclofenac Sodium 1 % Apply  to skin as directed.        Diclofenac Sodium (Gel) Diclofenac Sodium 1 % every day.        Donepezil HCl (Tab) ARICEPT 10 MG Take 10 mg by mouth every bedtime.        Dutasteride (Cap) AVODART 0.5 MG Take 0.5 mg by mouth  every day.        Econazole Nitrate (Cream) econazole nitrate 1 % Apply  to affected area(s) 2 times a day.        Fenofibrate (Tab) TRICOR 145 MG Take 145 mg by mouth every bedtime.        FLUoxetine HCl (Cap) PROZAC 20 MG Take 40 mg by mouth every bedtime.        Fluticasone Propionate (Suspension) FLONASE 50 MCG/ACT Spray 1 Spray in nose every day.        Gabapentin (Cap) NEURONTIN 300 MG         Ipratropium Bromide (Solution) ATROVENT 0.03 % Spray 2 Sprays in nose 3 times a day.        Isosorbide Mononitrate (TABLET SR 24 HR) IMDUR 30 MG Take 1 Tab by mouth every day.        Memantine HCl (CAPSULE SR 24 HR) Memantine HCl ER 21 MG Take 21 mg by mouth every bedtime.        MetFORMIN HCl (Tab) GLUCOPHAGE 500 MG Take 500 mg by mouth 2 times a day, with meals.        Metoprolol Succinate (TABLET SR 24 HR) TOPROL XL 25 MG Take 1 Tab by mouth every day.        Nitroglycerin (SL Tab) NITROSTAT 0.4 MG Place 1 Tab under tongue as needed for Chest Pain (take every 5 minutes x 3 for chest pain, call 911 if not resolved or continued chest pain).        Omega-3 Fatty Acids (Cap) fish oil 1000 MG Take 1,000 mg by mouth every day.        Omeprazole (CAPSULE DELAYED RELEASE) PRILOSEC 20 MG Take 20 mg by mouth 2 times a day.        Ondansetron (TABLET DISPERSIBLE) ZOFRAN ODT 4 MG Take 4 mg by mouth every 8 hours as needed for Nausea/Vomiting.        Potassium Chloride (Cap CR) MICRO-K 10 MEQ Take 10 mEq by mouth 2 times a day.        Potassium Gluconate (Tab) Potassium Gluconate 550 MG Take 1 Tab by mouth every day.        Rosuvastatin Calcium (Tab) CRESTOR 10 MG Take 10 mg by mouth every evening.        Terazosin HCl (Cap) HYTRIN 1 MG Take 1 mg by mouth every evening.        Tiotropium Bromide Monohydrate (Cap) SPIRIVA 18 MCG Inhale 1 Cap by mouth every day.        Zolpidem Tartrate (Tab) AMBIEN 5 MG Take 1-2 tablets by mouth every evening as needed for insomnia. Bring to sleep study.        .                 Medicines  prescribed today were sent to:     [facility] - [place], [place] - [address] [facility] [facility]    [address] [facility] [address]    Phone: [phone] Fax: [phone]    Open 24 Hours?: No    [facility] [place] [address] [facility]    [address] [facility] [address]    Phone: [phone] Fax: [phone]    Open 24 Hours?: No      Medication refill instructions:       If your prescription bottle indicates you have medication refills left, it is not necessary to call your provider’s office. Please contact your pharmacy and they will refill your medication.    If your prescription bottle indicates you do not have any refills left, you may request refills at any time through one of the following ways: The online [employer] system (except Urgent Care), by calling your provider’s office, or by asking your pharmacy to contact your provider’s office with a refill request. Medication refills are processed only during regular business hours and may not be available until the next business day. Your provider may request additional information or to have a follow-up visit with you prior to refilling your medication.   *Please Note: Medication refills are assigned a new Rx number when refilled electronically. Your pharmacy may indicate that no refills were authorized even though a new prescription for the same medication is available at the pharmacy. Please request the medicine by name with the pharmacy before contacting your provider for a refill.        Your To Do List     Future Labs/Procedures Complete By Expires    POLYSOMNOGRAPHY TITRATION  As directed 6/26/2018         C[employer] Access Code: [email or web address]  Expires: 7/26/2017 10:02 AM    [employer]  A secure, online tool to manage your health information     [employer]® is a secure, online tool that connects you to your personalized health information from the privacy of your home -- day or night - making it  very easy for you to manage your healthcare. Once the activation process is completed, you can even access your medical information using the [employer] vianca, which is available for free in the Apple Vianca store or Google Play store.     [employer] provides the following levels of access (as shown below):   My Chart Features   [facility] Doctor [facility]  Specialists [facility]  Urgent  Care Non-[facility]  Primary Care  Doctor   Email your healthcare team securely and privately 24/7 X X X    Manage appointments: schedule your next appointment; view details of past/upcoming appointments X      Request prescription refills. X      View recent personal medical records, including lab and immunizations X X X X   View health record, including health history, allergies, medications X X X X   Read reports about your outpatient visits, procedures, consult and ER notes X X X X   See your discharge summary, which is a recap of your hospital and/or ER visit that includes your diagnosis, lab results, and care plan. X X       How to register for [employer]:  1. Go to  [email or web address].  2. Click on the Sign Up Now box, which takes you to the New Member Sign Up page. You will need to provide the following information:  a. Enter your [employer] Access Code exactly as it appears at the top of this page. (You will not need to use this code after you’ve completed the sign-up process. If you do not sign up before the expiration date, you must request a new code.)   b. Enter your date of birth.   c. Enter your home email address.   d. Click Submit, and follow the next screen’s instructions.  3. Create a [employer] ID. This will be your [employer] login ID and cannot be changed, so think of one that is secure and easy to remember.  4. Create a [employer] password. You can change your password at any time.  5. Enter your Password Reset Question and Answer. This can be used at a later time if you forget your password.   6. Enter your e-mail address. This  allows you to receive e-mail notifications when new information is available in [employer].  7. Click Sign Up. You can now view your health information.    For assistance activating your [employer] account, call [phone]

## 2017-06-26 NOTE — PROGRESS NOTES
CC: Sleep follow-up    HPI:  The patient is on a bilevel of 18/17 and has complaints about his pressure. He was also having mask issues. He feels that he is worked out the mask issues with the pressure seems hard to tolerate. His usage has been excellent 8 hours and 38 minutes for 100% of the days with a residual AHI of 6.9.    Last seen April 19, 2017  This is a 71 y.o. male with a history of shortness of breath and obstructive sleep apnea. Pulmonary function tests from 8/11/16 indicated FEV1 3.19 L, 100% predicted, FEV1/FVC 74%, FEF 25-75% 96% predicted with positive bronchodilator response, and DLCO 136% predicted. The patient is compliant with Spiriva daily and prior as needed. The patient has a history of tuberculosis and underwent treatment in 1954. He has been on Chantix for 2 years. He continues to smoke. Due to increase in shortness of breath a CTA was obtained 3/28/17 indicating no pulmonary embolus with bibasilar scarring however otherwise clear lungs. There is a stable benign-appearing cyst in the anterior mediastinum. The patient reports that his shortness of breath has improved since his last visit in March. He has been more active and this is helping. He is encouraged to walk daily. He denies fevers, chills, sweats, hemoptysis. He has occasional wheezing.    Polysomnogram indicates AHI 15.3 and minimum saturation 85%. The patient is compliant with BiPAP 18/17 cm. Compliance dated 3/20-4/18/17 indicates 100% compliance for 8 hours 9 minutes. AHI is reduced to 6.0. There is significant leakage. The patient feels rested when he wakes up. He denies early morning headaches. He does not nap in the daytime. He typically wakes up between 1-2 times per night.          Patient Active Problem List    Diagnosis Date Noted   • History of COPD 10/11/2016     Priority: Low   • Cardiac pacemaker in situ 09/28/2011     Priority: Low   • Essential hypertension, benign 09/28/2011     Priority: Low   • Dyslipidemia  09/28/2011     Priority: Low   • Alzheimer's disease 10/11/2016   • Shortness of breath 08/11/2016   • JONATHAN treated with BiPAP 04/27/2016   • Carpal tunnel syndrome 04/18/2013   • S/P CABG x 4 09/28/2011       Past Medical History   Diagnosis Date   • Arthritis    • Hypertension    • Congestive heart failure (CMS-HCC)    • Pain      back,legs,arms,pain scale 8   • Dental disorder      dentures   • Snoring    • Psychiatric problem    • Breath shortness    • Pacemaker    • Sleep apnea      uses bypap   • Pneumonia 2004   • Tuberculosis      treated for as a teenager   • Diabetes      on oral meds   • Anesthesia      CONFUSED/FIGHTING FOR 10 DAYS AFTER SURGERY   • JONATHAN treated with BiPAP 4/27/2016   • Shortness of breath 8/11/2016        Past Surgical History   Procedure Laterality Date   • Other cardiac surgery  1/22/97     quadruple bypass   • Hip arthroplasty total  10/6/2006     right   • Rotator cuff repair  12/8/06,10/6/07     bilateral,with muscle repair   • Lipoma excision  2005   • Pr repair of hammertoe,one  2001     right foot   # 2,3,4,5   • Lumbar decompression  1/29/2010     Performed by LEO PENALOZA at SURGERY [facility]   • Lumbar laminectomy diskectomy  1/29/2010     Performed by LEO PENALOZA at SURGERY [facility]   • Foraminotomy  1/29/2010     Performed by LEO PENALOZA at SURGERY [facility]   • Recovery  11/30/2010     Performed by SURGERY, [facility]-RECOV[facility] at SURGERY SAME DAY [facility] ORS   • Carpal tunnel release  4/18/2013     Performed by Leo Penaloza M.D. at SURGERY [facility] ORS   • Carpal tunnel release  5/2/2013     Performed by Leo Penaloza M.D. at SURGERY [facility] ORS   • Stent placement  10/28/19       Family History   Problem Relation Age of Onset   • Cancer Mother        Social History     Social History   • Marital Status:      Spouse Name: N/A   • Number of Children: N/A   • Years of Education: N/A     Occupational History   • Not on file.      Social History Main Topics   • Smoking status: Former Smoker -- 3.00 packs/day for 44 years     Types: Cigarettes     Quit date: 01/12/2008   • Smokeless tobacco: Former User     Types: Chew     Quit date: 01/01/1975      Comment: 3 ppd 45 yrs,quit 8/31/08   • Alcohol Use: No   • Drug Use: No   • Sexual Activity: Not on file     Other Topics Concern   • Not on file     Social History Narrative       Current Outpatient Prescriptions   Medication Sig Dispense Refill   • BUTRANS 15 MCG/HR PATCH WEEKLY every 7 days.  0   • Diclofenac Sodium 1 % Gel every day.  0   • gabapentin (NEURONTIN) 300 MG Cap   0   • albuterol 108 (90 BASE) MCG/ACT Aero Soln inhalation aerosol Inhale 2 Puffs by mouth every four hours as needed for Shortness of Breath. 3 Inhaler 3   • tiotropium (SPIRIVA HANDIHALER) 18 MCG Cap Inhale 1 Cap by mouth every day. 90 Cap 3   • potassium chloride (MICRO-K) 10 MEQ capsule Take 10 mEq by mouth 2 times a day.     • clopidogrel (PLAVIX) 75 MG Tab Take 75 mg by mouth every day.     • terazosin (HYTRIN) 1 MG Cap Take 1 mg by mouth every evening.     • ondansetron (ZOFRAN ODT) 4 MG TABLET DISPERSIBLE Take 4 mg by mouth every 8 hours as needed for Nausea/Vomiting.     • econazole nitrate 1 % cream Apply  to affected area(s) 2 times a day.     • Diclofenac Sodium 1 % Cream Apply  to skin as directed.     • ipratropium (ATROVENT) 0.03 % Solution Spray 2 Sprays in nose 3 times a day. 3 Bottle 1   • isosorbide mononitrate SR (IMDUR) 30 MG TABLET SR 24 HR Take 1 Tab by mouth every day. 30 Tab 2   • metoprolol SR (TOPROL XL) 25 MG TABLET SR 24 HR Take 1 Tab by mouth every day. 30 Tab 2   • nitroglycerin (NITROSTAT) 0.4 MG SL Tab Place 1 Tab under tongue as needed for Chest Pain (take every 5 minutes x 3 for chest pain, call 911 if not resolved or continued chest pain). 25 Tab 2   • dutasteride (AVODART) 0.5 MG capsule Take 0.5 mg by mouth every day.     • fenofibrate (TRICOR) 145 MG Tab Take 145 mg by mouth every  "bedtime.     • rosuvastatin (CRESTOR) 10 MG Tab Take 10 mg by mouth every evening.     • donepezil (ARICEPT) 10 MG tablet Take 10 mg by mouth every bedtime.     • Memantine HCl ER (NAMENDA XR) 21 MG CAPSULE SR 24 HR Take 21 mg by mouth every bedtime.     • fluoxetine (PROZAC) 20 MG Cap Take 40 mg by mouth every bedtime.     • metformin (GLUCOPHAGE) 500 MG Tab Take 500 mg by mouth 2 times a day, with meals.     • baclofen (LIORESAL) 20 MG tablet Take 20-40 mg by mouth See Admin Instructions. 20 mg in AM  40 mg at BEDTIME     • Buprenorphine (BUTRANS) 10 MCG/HR PATCH WEEKLY Apply 15 mcg to skin as directed every Sunday.     • fluticasone (FLONASE) 50 MCG/ACT nasal spray Spray 1 Spray in nose every day.     • omeprazole (PRILOSEC) 20 MG delayed-release capsule Take 20 mg by mouth 2 times a day.     • Omega-3 Fatty Acids (FISH OIL) 1000 MG Cap capsule Take 1,000 mg by mouth every day.     • aspirin EC (ECOTRIN) 81 MG Tablet Delayed Response Take 81 mg by mouth every day.     • B Complex Vitamins (VITAMIN B COMPLEX PO) Take 1 Tab by mouth every day.     • Potassium Gluconate 550 MG Tab Take 1 Tab by mouth every day.       No current facility-administered medications for this visit.    \"CURRENT RX\"    ALLERGIES: Tape and Valium    ROS  Constitutional: Denies fevers, chills, sweats, fatigue, and weight loss.  Eyes: Denies glasses.  Ears/nose/mouth/throat: Denies injury.  Cardiovascular: Denies chest pain, tightness.  Respiratory: See history of present illness  GI: Denies heartburn, difficulty swallowing, nausea, and vomiting.  Neurological: Denies frequent headaches, dizziness, weakness.  Sleep: See history of present illness.    PHYSICAL EXAM    /68 mmHg  Pulse 68  Temp(Src) 37.2 °C (99 °F)  Resp 18  SpO2 96%  Appearance: Well-developed, well-nourished, no acute distress.  Eyes. PERRL.  Hearing: Grossly intact.  Oropharynx: Tongue normal, posterior pharynx without erythema or exudate.  Respiratory effort: No " intercostal retractions or use of accessory muscles.  Lung auscultation: No crackles, wheezing.  Heart auscultation: No murmur, gallop, or rub. Regular rate and rhythm.  Extremities: No cyanosis or edema.  Gait and Station: Normal  Orientation: Oriented to time, place, and person.    PROBLEMS:  Obstructive sleep apnea syndrome  Shortness of breath  History of CABG ×4  History of subsequent coronary stent  Normal pulmonary function  Ongoing tobacco abuse dyslipidemia    PLAN:  He is doing poorly and not tolerating the high pressure and longer. The ahi remains elevated at 6.9.    Will do a BiLevel re-titration then RTC for review.    Continue albuterol rescue and Spiriva HandiHaler 18 µg caps daily.        In the interim per his request we will place him on bilevel of 16/12.

## 2017-08-16 ENCOUNTER — SLEEP STUDY (OUTPATIENT)
Dept: SLEEP MEDICINE | Facility: MEDICAL CENTER | Age: 72
End: 2017-08-16
Attending: INTERNAL MEDICINE
Payer: MEDICARE

## 2017-08-16 DIAGNOSIS — G47.33 OSA TREATED WITH BIPAP: Chronic | ICD-10-CM

## 2017-08-16 PROCEDURE — 95811 POLYSOM 6/>YRS CPAP 4/> PARM: CPT | Performed by: INTERNAL MEDICINE

## 2017-08-17 NOTE — PROCEDURES
Clinical Comments:  The patient underwent a BIPAP titration using the standard montage for measurement of parameters of sleep, respiratory events, movement abnormalities, heart rate and rhythm. A microphone was used to monitor snoring.      ANALYSIS:  The total recording time was 483.8 minutes with a sleep period of 450.2 minutes and the total sleep time was 377.5 minutes with a sleep efficiency of 78.0%.  The sleep latency was 33.6 minutes, and REM latency was 228.5 minutes.  The patient experienced 52 arousals in total, for an arousal index of 8.3    RESPIRATORY: The patient had 19 apneas in total.  Of these, 1 were obstructive apneas, and 18 were central apneas.  This resulted in an apnea index (AI) of 3.0.  The patient had 5 hypopneas, for a hypopnea index of 0.8.  The overall AHI was 3.8, while the AHI during Stage R sleep was 2.4.  AHI while supine was 3.8.    OXIMETRY: Oxygen saturation monitoring showed a mean SpO2 of 92.8%, with a minimum oxygen saturation of 91.0%.  Oxygen saturations were less than or = 89% for 0.0 minutes of sleep time.    CARDIAC: The highest heart rate during the recording was 73.0 beats per minute.  The average heart rate during sleep was 60.1 bpm.    LIMB MOVEMENTS: There were a total of 65 PLMs during sleep, of which 12 were PLMs arousals.  This resulted in a PLMS index of 10.3.      Interpretation:    Mr. Gillis has a history of sleep apnea hypopnea syndrome. Previous diagnostic polysomnography demonstrated an apnea hypopnea index of 15.3 events per hour with a lowest arterial oxygen saturation of 85% on room air. He has been treated with BiPAP at 18/17 cm water but has had troubles with that therapy. This study was designed recalibrate treatment.    There is fragmentation of sleep with reduced sleep efficiency, increased wake after sleep onset time, prolonged sleep onset latency, and an elevated arousal and awakening index, but significant REM sleep time was recorded. There are  frequent periodic limb movements but the periodic limb movement arousal index is only 1.9 events per hour.    BiPAP was adjusted across a pressure range of 15-17/10-12 cm water pressure. In the final 2 pressures stages, the apnea hypopnea index was less than 3 events per hour with a minimum arterial oxygen saturation of 91% on room air. All of the study was done in the supine body position in the final stages and the pressure titration including significant REM sleep time.    Assessment:  This study demonstrates an excellent response to BiPAP therapy in a patient with previously demonstrated sleep apnea hypopnea. There is fragmentation of sleep related to the breathing events and to laboratory and treatment effects.    Recommendations:  BiPAP at a reduced pressure 16/11 cm water. He did best with a medium Eson mask but did require a chinstrap because of mouth leaks.

## 2017-08-23 ENCOUNTER — SLEEP CENTER VISIT (OUTPATIENT)
Dept: SLEEP MEDICINE | Facility: MEDICAL CENTER | Age: 72
End: 2017-08-23
Payer: MEDICARE

## 2017-08-23 VITALS
HEIGHT: 72 IN | DIASTOLIC BLOOD PRESSURE: 68 MMHG | OXYGEN SATURATION: 97 % | TEMPERATURE: 98.1 F | WEIGHT: 202 LBS | BODY MASS INDEX: 27.36 KG/M2 | RESPIRATION RATE: 16 BRPM | SYSTOLIC BLOOD PRESSURE: 112 MMHG | HEART RATE: 67 BPM

## 2017-08-23 DIAGNOSIS — G47.33 OSA (OBSTRUCTIVE SLEEP APNEA): ICD-10-CM

## 2017-08-23 DIAGNOSIS — G47.33 OSA TREATED WITH BIPAP: Chronic | ICD-10-CM

## 2017-08-23 DIAGNOSIS — J44.9 CHRONIC OBSTRUCTIVE PULMONARY DISEASE, UNSPECIFIED COPD TYPE (HCC): ICD-10-CM

## 2017-08-23 DIAGNOSIS — Z71.6 TOBACCO ABUSE COUNSELING: ICD-10-CM

## 2017-08-23 PROCEDURE — 99214 OFFICE O/P EST MOD 30 MIN: CPT | Performed by: NURSE PRACTITIONER

## 2017-08-23 RX ORDER — BUPRENORPHINE 15 UG/H
PATCH TRANSDERMAL
Refills: 0 | COMMUNITY
Start: 2017-07-22

## 2017-08-23 NOTE — PROGRESS NOTES
CC:  Here for f/u sleep and pulmonary issues as listed below    HPI:   Al presents today for follow up for OJNATHAN and dyspnea.     [place] has completed titration studies throughout the year, the most up to date completed 8/2017 due to intolerance to the machine. Study was successful and a BiPAP pressure of 16/11 with reduced AHI, correctional low oxygen, and REM rebound. Currently he is being treated with BIPAP @ 16/64ciR54, after his pressures were dropped last office visit from 18/17.  Compliance download from the dates 5/27/2017 - 6/25/2017 indicates he is wearing the device 100% for an avg of 8 hours and 38 minutes per night with a reduced AHI of 6.9. This compliance is from previous settings. He does tolerate pressure much better now. He likes his mask.  He wakes up refreshed and is less tired throughout the day. He denies morning H/A and sleep better overall. He will continue to clean supplies weekly and change them as insurance allows.     Pulmonary function tests from 8/11/16 indicated FEV1 3.19 L, 100% predicted, FEV1/FVC 74%, FEF 25-75% 96% predicted with positive bronchodilator response, and DLCO 136% predicted.Due to increase in shortness of breath a CTA was obtained 3/28/17 indicating no pulmonary embolus with bibasilar scarring however otherwise clear lungs. There is a stable benign-appearing cyst in the anterior mediastinum.  The patient has a history of tuberculosis and underwent treatment in 1954. He is a current smoker appx 1 pack/day. He had quit, but recently started smoking again since his most recent cat scan did not show lung cancer. We reviewed the detriments of smoking and potential for lung CA in the future. He is not interested in quitting at this time.    He is compliant with Spiriva daily and Proair as needed appx 5x/day. He c/o increase shortness of breath with exertion. He has been active with gardening this summer and typically walks in the winter  time. He has a cough x 2 months that he related to nasal congestion. He takes Flonase with some relief. He has occasional wheezing.He denies fevers, chills, sweats, hemoptysis.           Patient Active Problem List    Diagnosis Date Noted   • History of COPD 10/11/2016     Priority: Low   • Cardiac pacemaker in situ 09/28/2011     Priority: Low   • Essential hypertension, benign 09/28/2011     Priority: Low   • Dyslipidemia 09/28/2011     Priority: Low   • Tobacco abuse counseling 08/23/2017   • Chronic obstructive pulmonary disease (CMS-HCC) 08/23/2017   • JONATHAN (obstructive sleep apnea) 08/23/2017   • Alzheimer's disease 10/11/2016   • Shortness of breath 08/11/2016   • JONATHAN treated with BiPAP 04/27/2016   • Carpal tunnel syndrome 04/18/2013   • S/P CABG x 4 09/28/2011       Past Medical History   Diagnosis Date   • Arthritis    • Hypertension    • Congestive heart failure (CMS-HCC)    • Pain      back,legs,arms,pain scale 8   • Dental disorder      dentures   • Snoring    • Psychiatric problem    • Breath shortness    • Pacemaker    • Sleep apnea      uses bypap   • Pneumonia 2004   • Tuberculosis      treated for as a teenager   • Diabetes      on oral meds   • Anesthesia      CONFUSED/FIGHTING FOR 10 DAYS AFTER SURGERY   • JONATHAN treated with BiPAP 4/27/2016   • Shortness of breath 8/11/2016       Past Surgical History   Procedure Laterality Date   • Other cardiac surgery  1/22/97     quadruple bypass   • Hip arthroplasty total  10/6/2006     right   • Rotator cuff repair  12/8/06,10/6/07     bilateral,with muscle repair   • Lipoma excision  2005   • Pr repair of hammertoe,one  2001     right foot   # 2,3,4,5   • Lumbar decompression  1/29/2010     Performed by IRAM LAGUNA at SURGERY [facility] ORS   • Lumbar laminectomy diskectomy  1/29/2010     Performed by IRAM LAGUNA at SURGERY [facility] ORS   • Foraminotomy  1/29/2010     Performed by IRAM LAGUNA at SURGERY [facility] ORS   • Recovery  11/30/2010      Performed by SURGERY, CATH-RECOVERY at SURGERY SAME DAY [facility] ORS   • Carpal tunnel release  4/18/2013     Performed by Leo Penaloza M.D. at SURGERY [facility] ORS   • Carpal tunnel release  5/2/2013     Performed by Leo Penaloza M.D. at SURGERY [facility] ORS   • Stent placement  10/28/19       Family History   Problem Relation Age of Onset   • Cancer Mother        Social History     Social History   • Marital Status:      Spouse Name: N/A   • Number of Children: N/A   • Years of Education: N/A     Occupational History   • Not on file.     Social History Main Topics   • Smoking status: Former Smoker -- 3.00 packs/day for 44 years     Types: Cigarettes     Quit date: 01/12/2008   • Smokeless tobacco: Former User     Types: Chew     Quit date: 01/01/1975      Comment: 3 ppd 45 yrs,quit 8/31/08   • Alcohol Use: No   • Drug Use: No   • Sexual Activity: Not on file     Other Topics Concern   • Not on file     Social History Narrative       Current Outpatient Prescriptions   Medication Sig Dispense Refill   • Buprenorphine 15 MCG/HR PATCH WEEKLY   0   • Fluticasone Furoate-Vilanterol (BREO ELLIPTA) 100-25 MCG/INH AEROSOL POWDER, BREATH ACTIVATED Take 1 Inhalation by mouth every day. Rinse mouth after use. 3 Each 3   • Diclofenac Sodium 1 % Gel every day.  0   • gabapentin (NEURONTIN) 300 MG Cap   0   • albuterol 108 (90 BASE) MCG/ACT Aero Soln inhalation aerosol Inhale 2 Puffs by mouth every four hours as needed for Shortness of Breath. 3 Inhaler 3   • potassium chloride (MICRO-K) 10 MEQ capsule Take 10 mEq by mouth 2 times a day.     • clopidogrel (PLAVIX) 75 MG Tab Take 75 mg by mouth every day.     • terazosin (HYTRIN) 1 MG Cap Take 1 mg by mouth every evening.     • ondansetron (ZOFRAN ODT) 4 MG TABLET DISPERSIBLE Take 4 mg by mouth every 8 hours as needed for Nausea/Vomiting.     • econazole nitrate 1 % cream Apply  to affected area(s) 2 times a day.     • isosorbide mononitrate SR (IMDUR) 30  MG TABLET SR 24 HR Take 1 Tab by mouth every day. 30 Tab 2   • metoprolol SR (TOPROL XL) 25 MG TABLET SR 24 HR Take 1 Tab by mouth every day. 30 Tab 2   • dutasteride (AVODART) 0.5 MG capsule Take 0.5 mg by mouth every day.     • fenofibrate (TRICOR) 145 MG Tab Take 145 mg by mouth every bedtime.     • rosuvastatin (CRESTOR) 10 MG Tab Take 40 mg by mouth every evening.     • donepezil (ARICEPT) 10 MG tablet Take 10 mg by mouth every bedtime.     • Memantine HCl ER (NAMENDA XR) 21 MG CAPSULE SR 24 HR Take 21 mg by mouth every bedtime.     • fluoxetine (PROZAC) 20 MG Cap Take 40 mg by mouth every bedtime.     • metformin (GLUCOPHAGE) 500 MG Tab Take 500 mg by mouth 2 times a day, with meals.     • baclofen (LIORESAL) 20 MG tablet Take 20-40 mg by mouth See Admin Instructions. 20 mg in AM  40 mg at BEDTIME     • fluticasone (FLONASE) 50 MCG/ACT nasal spray Spray 1 Spray in nose 3 times a day.     • omeprazole (PRILOSEC) 20 MG delayed-release capsule Take 20 mg by mouth 2 times a day.     • aspirin EC (ECOTRIN) 81 MG Tablet Delayed Response Take 81 mg by mouth every day.     • BUTRANS 15 MCG/HR PATCH WEEKLY every 7 days.  0   • zolpidem (AMBIEN) 5 MG Tab Take 1-2 tablets by mouth every evening as needed for insomnia. Bring to sleep study. 3 Tab 0   • Diclofenac Sodium 1 % Cream Apply  to skin as directed.     • Buprenorphine (BUTRANS) 10 MCG/HR PATCH WEEKLY Apply 15 mcg to skin as directed every Sunday.     • Omega-3 Fatty Acids (FISH OIL) 1000 MG Cap capsule Take 1,000 mg by mouth every day.     • B Complex Vitamins (VITAMIN B COMPLEX PO) Take 1 Tab by mouth every day.     • Potassium Gluconate 550 MG Tab Take 1 Tab by mouth every day.       No current facility-administered medications for this visit.          Allergies: Tape and Valium      ROS   Gen: Denies fever, chills, unintentional weight loss, fatigue, night sweats  E/N/T: Denies ear pain,   Resp:Denies  sputum production, hemoptysis  CV: Denies chest pain,  "chest tightness, palpitations, BLE edema  Sleep:Denies morning headache, insomnia, daytime somnolence, snoring, gasping for air, apnea  Neuro: Denies frequent headaches, weakness, dizziness  GI: Denies N/V, acid reflux/heartburn  See HPI.  All other systems reviewed and negative      Vital signs for this encounter:  Filed Vitals:    08/23/17 1031   Height: 1.829 m (6' 0.01\")   Weight: 91.627 kg (202 lb)   Weight % change since last entry.: 0 %   BP: 112/68   Pulse: 67   BMI (Calculated): 27.39   Resp: 16   Temp: 36.7 °C (98.1 °F)   O2 sat % room air: 97 %                 Physical Exam:   Appearance: well developed, well nourished, no acute distress.  Eyes: PERRL, EOM intact, sclere white, conjunctiva moist.  Ears: no lesions or deformities.  Hearing: grossly intact.  Nose: no lesions or deformities.  Dentition: good dentition.  Oropharynx: tongue normal, posterior pharynx without erythema or exudate.  Neck: supple, trachea midline, no masses.  Respiratory effort: no intercostal retractions or use of accessory muscles.  Lung auscultation: Bilateral diminished   Heart auscultation: no murmur, rub, or gallop.   Extremities: no cyanosis or edema.  Abdomen: soft, non-tender, no masses.  Gait and station: grossly normal   Digits and Nails: no clubbing, cyanosis, petechiae, or nodes.  Cranial nerves: grossly normal.  Motor: no focal deficits observed.  Skin: no rashes, lesions, or ulcers noted.  Orientation: oriented to time, place, and person.  Mood and affect: mood and affect appropriate, normal interaction with examiner.    Assessment   1. JONATHAN (obstructive sleep apnea)  DME OTHER    DME OTHER    DME OTHER    Fluticasone Furoate-Vilanterol (BREO ELLIPTA) 100-25 MCG/INH AEROSOL POWDER, BREATH ACTIVATED    AMB PULMONARY STRESS TESTING (6 MIN WALK)    AMB PULMONARY STRESS TESTING (6 MIN WALK)   2. Chronic obstructive pulmonary disease, unspecified COPD type (CMS-HCC)  AMB PULMONARY STRESS TESTING (6 MIN WALK)   3. Tobacco " abuse counseling     4. JONATHAN treated with BiPAP         Patient was seen for 35 minutes, more than 50% of time spent in face to face review, counseling, and arranging future evaluation and follow up of medical conditions and care.     PLAN:   Patient Instructions   1) Continue BIPAP and change pressure 16/70ciM01  2) Clean mask and supplies weekly and change them as insurance allows  3) Smoking cessation discussed  4) Start using Breo 100-25 1 puff, once a day with mouth rinse  5) Continue rescue inhaler and Spiriva   6) Vaccines: Recommended  7) Continue dietary changes and exercise  8) Continue Flonase, increase twice a day and add sinus rinse  9) Return in about 2 months (around 10/23/2017) for Compliance, review of symptoms, if not sooner, follow up with VIRGIL Arias, Pulmonary clinic only appt, 6 min walk.

## 2017-08-23 NOTE — PATIENT INSTRUCTIONS
1) Continue BIPAP and change pressure 16/64muD44  2) Clean mask and supplies weekly and change them as insurance allows  3) Smoking cessation discussed  4) Start using Breo 100-25 1 puff, once a day with mouth rinse  5) Continue rescue inhaler and Spiriva   6) Vaccines: Recommended  7) Continue dietary changes and exercise  8) Continue Flonase, increase twice a day and add sinus rinse  9) Return in about 2 months (around 10/23/2017) for Compliance, review of symptoms, if not sooner, follow up with VIRGIL Arias, Pulmonary clinic only appt, 6 min walk.

## 2017-08-23 NOTE — MR AVS SNAPSHOT
"        Al Gillis   2017 10:40 AM   Sleep Center Visit   MRN: [id number]    Department:  [facility]   Dept Phone:  [phone]    Description:  Male : 1945   Provider:  ALECIA Elizabeth           Reason for Visit     Apnea Last seen 17    Results SS      Allergies as of 2017     Allergen Noted Reactions    Tape 2010       Paper tape ok,coban ok    Valium 2008       Psychological changes,'history of addiction to valium'      You were diagnosed with     JONATHAN (obstructive sleep apnea)   [[id number]]       Chronic obstructive pulmonary disease, unspecified COPD type (CMS-[facility])   [3025230]       Tobacco abuse counseling   [[id number]]       JONATHAN treated with BiPAP   [[id number]]         Vital Signs     Blood Pressure Pulse Temperature Respirations Height Weight    112/68 mmHg 67 36.7 °C (98.1 °F) 16 1.829 m (6' 0.01\") 91.627 kg (202 lb)    Body Mass Index Oxygen Saturation Smoking Status             27.39 kg/m2 97% Former Smoker         Basic Information     Date Of Birth Sex Race Ethnicity Preferred Language    1945 Male White Non- English      Your appointments     2017  3:30 PM   Pulmonary Function Test with SPIROMETRY/6MW   [facility] Pulmonary Medicine (--)    [address]  [address]  [place] [address] [phone]            2017  4:00 PM   Established Patient Pul with RAYMUNDO Francis   [facility]monary Medicine (--)    [address]  [address]  [place] [address]   [phone]              Problem List              ICD-10-CM Priority Class Noted - Resolved    Cardiac pacemaker in situ (Chronic) Z95.0 Low  2011 - Present    S/P CABG x 4 (Chronic) Z95.1   2011 - Present    Essential hypertension, benign (Chronic) I10 Low  2011 - Present    Dyslipidemia (Chronic) E78.5 Low  2011 - Present    Carpal tunnel syndrome G56.00   2013 - Present    JONATHAN treated with BiPAP (Chronic) G47.33   " 4/27/2016 - Present    Shortness of breath R06.02   8/11/2016 - Present    Alzheimer's disease G30.9   10/11/2016 - Present    History of COPD (Chronic) Z87.09 Low  10/11/2016 - Present    Tobacco abuse counseling Z71.6   8/23/2017 - Present    Chronic obstructive pulmonary disease (CMS-HCC) J44.9   8/23/2017 - Present    JONATHAN (obstructive sleep apnea) G47.33   8/23/2017 - Present      Health Maintenance        Date Due Completion Dates    IMM DTaP/Tdap/Td Vaccine (1 - Tdap) 7/10/1963 ---    COLONOSCOPY 7/10/1994 ---    IMM ZOSTER VACCINE 7/10/2004 ---    IMM PNEUMOCOCCAL 65+ (ADULT) LOW/MEDIUM RISK SERIES (1 of 2 - PCV13) 7/10/2009 ---    IMM INFLUENZA (1) 9/1/2017 ---            Current Immunizations     No immunizations on file.      Below and/or attached are the medications your provider expects you to take. Review all of your home medications and newly ordered medications with your provider and/or pharmacist. Follow medication instructions as directed by your provider and/or pharmacist. Please keep your medication list with you and share with your provider. Update the information when medications are discontinued, doses are changed, or new medications (including over-the-counter products) are added; and carry medication information at all times in the event of emergency situations     Allergies:  TAPE - (reactions not documented)     VALIUM - (reactions not documented)               Medications  Valid as of: August 23, 2017 - 11:34 AM    Generic Name Brand Name Tablet Size Instructions for use    Albuterol Sulfate (Aero Soln) albuterol 108 (90 Base) MCG/ACT Inhale 2 Puffs by mouth every four hours as needed for Shortness of Breath.        Aspirin (Tablet Delayed Response) ECOTRIN 81 MG Take 81 mg by mouth every day.        B Complex Vitamins   Take 1 Tab by mouth every day.        Baclofen (Tab) LIORESAL 20 MG Take 20-40 mg by mouth See Admin Instructions. 20 mg in AM  40 mg at BEDTIME        Buprenorphine  (PATCH WEEKLY) Buprenorphine 10 MCG/HR Apply 15 mcg to skin as directed every Sunday.        Buprenorphine (PATCH WEEKLY) BUTRANS 15 MCG/HR every 7 days.        Buprenorphine (PATCH WEEKLY) Buprenorphine 15 MCG/HR         Clopidogrel Bisulfate (Tab) PLAVIX 75 MG Take 75 mg by mouth every day.        Diclofenac Sodium (Cream) Diclofenac Sodium 1 % Apply  to skin as directed.        Diclofenac Sodium (Gel) Diclofenac Sodium 1 % every day.        Donepezil HCl (Tab) ARICEPT 10 MG Take 10 mg by mouth every bedtime.        Dutasteride (Cap) AVODART 0.5 MG Take 0.5 mg by mouth every day.        Econazole Nitrate (Cream) econazole nitrate 1 % Apply  to affected area(s) 2 times a day.        Fenofibrate (Tab) TRICOR 145 MG Take 145 mg by mouth every bedtime.        FLUoxetine HCl (Cap) PROZAC 20 MG Take 40 mg by mouth every bedtime.        Fluticasone Furoate-Vilanterol (AEROSOL POWDER, BREATH ACTIVATED) Fluticasone Furoate-Vilanterol 100-25 MCG/INH Take 1 Inhalation by mouth every day. Rinse mouth after use.        Fluticasone Propionate (Suspension) FLONASE 50 MCG/ACT Spray 1 Spray in nose 3 times a day.        Gabapentin (Cap) NEURONTIN 300 MG         Isosorbide Mononitrate (TABLET SR 24 HR) IMDUR 30 MG Take 1 Tab by mouth every day.        Memantine HCl (CAPSULE SR 24 HR) Memantine HCl ER 21 MG Take 21 mg by mouth every bedtime.        MetFORMIN HCl (Tab) GLUCOPHAGE 500 MG Take 500 mg by mouth 2 times a day, with meals.        Metoprolol Succinate (TABLET SR 24 HR) TOPROL XL 25 MG Take 1 Tab by mouth every day.        Omega-3 Fatty Acids (Cap) fish oil 1000 MG Take 1,000 mg by mouth every day.        Omeprazole (CAPSULE DELAYED RELEASE) PRILOSEC 20 MG Take 20 mg by mouth 2 times a day.        Ondansetron (TABLET DISPERSIBLE) ZOFRAN ODT 4 MG Take 4 mg by mouth every 8 hours as needed for Nausea/Vomiting.        Potassium Chloride (Cap CR) MICRO-K 10 MEQ Take 10 mEq by mouth 2 times a day.        Potassium Gluconate (Tab)  Potassium Gluconate 550 MG Take 1 Tab by mouth every day.        Rosuvastatin Calcium (Tab) CRESTOR 10 MG Take 40 mg by mouth every evening.        Terazosin HCl (Cap) HYTRIN 1 MG Take 1 mg by mouth every evening.        Zolpidem Tartrate (Tab) AMBIEN 5 MG Take 1-2 tablets by mouth every evening as needed for insomnia. Bring to sleep study.        .                 Medicines prescribed today were sent to:     [facility] - [address]    [address] [facility] [address] [facility] [address]    Phone: [phone] Fax: [phone]    Open 24 Hours?: No    [facility] [place] [address]    [address] [place] [address]    Phone: [phone] Fax: [phone]    Open 24 Hours?: No      Medication refill instructions:       If your prescription bottle indicates you have medication refills left, it is not necessary to call your provider’s office. Please contact your pharmacy and they will refill your medication.    If your prescription bottle indicates you do not have any refills left, you may request refills at any time through one of the following ways: The online [employer] system (except Urgent Care), by calling your provider’s office, or by asking your pharmacy to contact your provider’s office with a refill request. Medication refills are processed only during regular business hours and may not be available until the next business day. Your provider may request additional information or to have a follow-up visit with you prior to refilling your medication.   *Please Note: Medication refills are assigned a new Rx number when refilled electronically. Your pharmacy may indicate that no refills were authorized even though a new prescription for the same medication is available at the pharmacy. Please request the medicine by name with the pharmacy before contacting your provider for a refill.        Your To Do List     Future Labs/Procedures Complete  By Expires    AMB PULMONARY STRESS TESTING (6 MIN WALK)  As directed 8/23/2018    AMB PULMONARY STRESS TESTING (6 MIN WALK)  As directed 8/23/2018      Instructions    1) Continue BIPAP and change pressure 16/91lsV30  2) Clean mask and supplies weekly and change them as insurance allows  3) Smoking cessation discussed  4) Start using Breo 100-25 1 puff, once a day with mouth rinse  5) Continue rescue inhaler and Spiriva   6) Vaccines: Recommended  7) Continue dietary changes and exercise  8) Continue Flonase, increase twice a day and add sinus rinse  9) Return in about 2 months (around 10/23/2017) for Compliance, review of symptoms, if not sooner, follow up with VIRGIL Arias, Pulmonary clinic only appt, 6 min walk.              [employer] Access Code: [email or web address]  Expires: 9/22/2017 10:31 AM    [employer]  A secure, online tool to manage your health information     [employer]’s [employer]® is a secure, online tool that connects you to your personalized health information from the privacy of your home -- day or night - making it very easy for you to manage your healthcare. Once the activation process is completed, you can even access your medical information using the [employer] vianca, which is available for free in the [employer] store or Google Play store.     [employer] provides the following levels of access (as shown below):   My Chart Features   [facility] Primary Care Doctor [facility]  Specialists [facility]  Urgent  Care Non-[facility]  Primary Care  Doctor   Email your healthcare team securely and privately 24/7 X X X    Manage appointments: schedule your next appointment; view details of past/upcoming appointments X      Request prescription refills. X      View recent personal medical records, including lab and immunizations X X X X   View health record, including health history, allergies, medications X X X X   Read reports about your outpatient visits, procedures, consult and ER notes X X X X   See your discharge  summary, which is a recap of your hospital and/or ER visit that includes your diagnosis, lab results, and care plan. X X       How to register for [employer]:  1. Go to  [email or web address].  2. Click on the Sign Up Now box, which takes you to the New Member Sign Up page. You will need to provide the following information:  a. Enter your [employer] Access Code exactly as it appears at the top of this page. (You will not need to use this code after you’ve completed the sign-up process. If you do not sign up before the expiration date, you must request a new code.)   b. Enter your date of birth.   c. Enter your home email address.   d. Click Submit, and follow the next screen’s instructions.  3. Create a [employer] ID. This will be your [employer] login ID and cannot be changed, so think of one that is secure and easy to remember.  4. Create a [employer] password. You can change your password at any time.  5. Enter your Password Reset Question and Answer. This can be used at a later time if you forget your password.   6. Enter your e-mail address. This allows you to receive e-mail notifications when new information is available in [employer].  7. Click Sign Up. You can now view your health information.    For assistance activating your [employer] account, call [phone]

## 2017-09-26 ENCOUNTER — TELEPHONE (OUTPATIENT)
Dept: PULMONOLOGY | Facility: HOSPICE | Age: 72
End: 2017-09-26

## 2017-09-26 DIAGNOSIS — R06.00 DYSPNEA, UNSPECIFIED TYPE: ICD-10-CM

## 2017-11-08 ENCOUNTER — OFFICE VISIT (OUTPATIENT)
Dept: PULMONOLOGY | Facility: HOSPICE | Age: 72
End: 2017-11-08
Payer: MEDICARE

## 2017-11-08 ENCOUNTER — NON-PROVIDER VISIT (OUTPATIENT)
Dept: PULMONOLOGY | Facility: HOSPICE | Age: 72
End: 2017-11-08
Attending: INTERNAL MEDICINE
Payer: MEDICARE

## 2017-11-08 VITALS
HEIGHT: 72 IN | SYSTOLIC BLOOD PRESSURE: 130 MMHG | OXYGEN SATURATION: 94 % | TEMPERATURE: 98.1 F | WEIGHT: 200 LBS | BODY MASS INDEX: 27.09 KG/M2 | DIASTOLIC BLOOD PRESSURE: 62 MMHG | HEART RATE: 60 BPM | RESPIRATION RATE: 16 BRPM

## 2017-11-08 DIAGNOSIS — J44.9 CHRONIC OBSTRUCTIVE PULMONARY DISEASE, UNSPECIFIED COPD TYPE (HCC): ICD-10-CM

## 2017-11-08 DIAGNOSIS — R06.00 DYSPNEA, UNSPECIFIED TYPE: ICD-10-CM

## 2017-11-08 DIAGNOSIS — G47.33 OSA TREATED WITH BIPAP: Chronic | ICD-10-CM

## 2017-11-08 PROCEDURE — 94620 AMB PULMONARY STRESS TESTING (6 MIN WALK): CPT | Performed by: INTERNAL MEDICINE

## 2017-11-08 PROCEDURE — 99213 OFFICE O/P EST LOW 20 MIN: CPT | Performed by: NURSE PRACTITIONER

## 2017-11-08 ASSESSMENT — 6 MINUTE WALK TEST (6MWT)
PERCEIVED BREATHLESSNESS AT 1 MIN: 0
HEART RATE AT 2 MIN: 61
O2 SAT PERCENT ROOM AIR: 94
SAO2 AT 6 MIN: 93
PERCEIVED BREATHLESSNESS AT 4 MIN: 0.5
TOTAL REST TIME: 0
PERCEIVED FATIGUE AT 4 MIN: 0
PERCEIVED FATIGUE AT 1 MIN: 0
PERCENT OF NORMAL WALKED: 56
SAO2 AT 2 MIN: 95
PERCEIVED FATIGUE AT 1 MIN: 0
SAO2 AT 5 MIN: 93
PERCEIVED FATIGUE AT 3 MIN: 0
SITTING BLOOD PRESSURE: 130/62
HEART RATE AT 4 MIN: 78
SAO2 AT 3 MIN: 93
PERCEIVED FATIGUE AT 5 MIN: 0
PERCEIVED BREATHLESSNESS AT 2 MIN: 0
PERCEIVED FATIGUE AT 6 MIN: 0
NUMBER OF RESTS: 0
PERCEIVED BREATHLESSNESS AT 6 MIN: 1
PERCEIVED BREATHLESSNESS AT 5 MIN: 1
PERCEIVED BREATHLESSNESS AT 2 MIN: 0.5
HEART RATE AT 3 MIN: 78
HEART RATE AT 6 MIN: 79
PERCEIVED FATIGUE AT 2 MIN: 0
SAO2 AT 1 MIN: 94
COMMENTS: TEST ON ROOM AIR.
PERCEIVED BREATHLESSNESS AT 3 MIN: 0.5
BLOOD PRESSURE AT 1 MIN: 138/72
PERCEIVED BREATHLESSNESS AT 1 MIN: 0.5
BLOOD PRESSURE: LEFT ARM
SAO2 AT 2 MIN: 93
HEART RATE AT 1 MIN: 60
PERCEIVED FATIGUE AT 2 MIN: 0
HEART RATE AT 5 MIN: 79
SAO2 AT 1 MIN: 95
HEART RATE AT 1 MIN: 74
HEART RATE AT 2 MIN: 72
AMBULATES WITH O2: WITHOUT O2
SAO2 AT 4 MIN: 93
HEART RATE: 60

## 2017-11-08 NOTE — PROCEDURES
Test on Room Air.  This is a 6 minutes walking test done syndrome. Initial O2 saturation was 94% room air with blood pressure of 130/62 millimeter mercury and heart rate of 60 beats per minutes    The patient was able to work 1000 feet which is 56% of predicted distance.  The patient to saturation remained around 93% for the entire test.  Maximum heart rate was 79 beats per minutes  The patient recovered back to baseline after 2 minutes.    Impression:  Impaired exercise capacity.  No exertional hypoxemia.

## 2017-11-08 NOTE — PROGRESS NOTES
Chief Complaint   Patient presents with   • Apnea     Commpliance   • COPD     6Min Walk         HPI:  This is a 72 y.o. male with a history of chronic obstructive pulmonary diseaseAnd obstructive sleep apnea. Pulmonary function tests from 8/11/16 indicate FEV1 3.19 L, 100% predicted, FEV1/FVC 74%, and DLCO 136% predicted. The patient is compliant with Spiriva 2.5 µg inhalations daily and albuterol HFA as needed. 6 minute walk today shows no indication for oxygenation. O2 saturation was a low of 93% the entire study. Due to increased shortness of breath  a CTA was obtained 3/28/17 indicating no PE with bibasilar scarring. There is stable benign-appearing cyst in the anterior mediastinum. The patient has a history of tuberculosis and underwent treatment in 1954. Ultimately the patient feels his pulmonary status stable. He denies significant shortness of breath. He denies fevers, chills, sweats, and hemoptysis.    Polysomnogram indicates AHI 15.3 and minimum saturation 85%. The patient is compliant with BiPAP 16/11 cm. The patient got his compliance card today but reports using his machine nightly. He is benefiting from therapy. He is not always rested but this is related to significant pain. He feels the lower pressure has been a great improvement in his tolerance.    Past Medical History:   Diagnosis Date   • Anesthesia     CONFUSED/FIGHTING FOR 10 DAYS AFTER SURGERY   • Arthritis    • Breath shortness    • Congestive heart failure ([facility])    • Dental disorder     dentures   • Diabetes     on oral meds   • Hypertension    • JONATHAN treated with BiPAP 4/27/2016   • Pacemaker    • Pain     back,legs,arms,pain scale 8   • Pneumonia 2004   • Psychiatric problem    • Shortness of breath 8/11/2016   • Sleep apnea     uses bypap   • Snoring    • Tuberculosis     treated for as a teenager       Past Surgical History:   Procedure Laterality Date   • CARPAL TUNNEL RELEASE  5/2/2013    Performed by Leo Penaloza M.D. at  "SURGERY [facility] ORS   • CARPAL TUNNEL RELEASE  4/18/2013    Performed by Leo Penaloza M.D. at SURGERY [facility] ORS   • RECOVERY  11/30/2010    Performed by SURGERY, CATH-RECOVERY at SURGERY SAME DAY [facility] ORS   • LUMBAR DECOMPRESSION  1/29/2010    Performed by LEO PENALOZA at SURGERY [facility] ORS   • LUMBAR LAMINECTOMY DISKECTOMY  1/29/2010    Performed by LEO PENALOZA at SURGERY [facility] ORS   • FORAMINOTOMY  1/29/2010    Performed by LEO PENALOZA at SURGERY [facility] ORS   • HIP ARTHROPLASTY TOTAL  10/6/2006    right   • LIPOMA EXCISION  2005   • PB REPAIR OF THANHERTOE,ONE  2001    right foot   # 2,3,4,5   • OTHER CARDIAC SURGERY  1/22/97    quadruple bypass   • ROTATOR CUFF REPAIR  12/8/06,10/6/07    bilateral,with muscle repair   • STENT PLACEMENT  10/28/19       Social History   Substance Use Topics   • Smoking status: Current Every Day Smoker     Packs/day: 3.00     Years: 44.00     Types: Cigarettes     Last attempt to quit: 1/12/2008   • Smokeless tobacco: Former User     Types: Chew     Quit date: 1/1/1975      Comment: 3 ppd 45 yrs,quit 8/31/08   • Alcohol use No       ROS:   Constitutional: Denies fevers, chills, sweats, fatigue, and weight loss.  Eyes: Denies glasses.  Ears/nose/mouth/throat: Denies injury.  Cardiovascular: Denies chest pain, tightness.  Respiratory: See history of present illness.  GI: Denies heartburn, difficulty swallowing, nausea, and vomiting.  Neurological: Denies frequent headaches, dizziness, weakness.    Vitals:  Vitals:    11/08/17 1538   Height: 1.829 m (6' 0.01\")   Weight: 90.7 kg (200 lb)   Weight % change since last entry.: 0 %   BP: 130/62   Pulse: 60   BMI (Calculated): 27.12   Resp: 16   Temp: 36.7 °C (98.1 °F)       Allergies:  Tape and Valium    Medications:  Current Outpatient Prescriptions   Medication Sig Dispense Refill   • Tiotropium Bromide Monohydrate (SPIRIVA RESPIMAT) 2.5 MCG/ACT Aero Soln Inhale  by mouth.     • albuterol 108 (90 " BASE) MCG/ACT Aero Soln inhalation aerosol Inhale 2 Puffs by mouth every four hours as needed for Shortness of Breath. 3 Inhaler 3   • Buprenorphine 15 MCG/HR PATCH WEEKLY   0   • BUTRANS 15 MCG/HR PATCH WEEKLY every 7 days.  0   • Diclofenac Sodium 1 % Gel every day.  0   • gabapentin (NEURONTIN) 300 MG Cap   0   • zolpidem (AMBIEN) 5 MG Tab Take 1-2 tablets by mouth every evening as needed for insomnia. Bring to sleep study. 3 Tab 0   • potassium chloride (MICRO-K) 10 MEQ capsule Take 10 mEq by mouth 2 times a day.     • clopidogrel (PLAVIX) 75 MG Tab Take 75 mg by mouth every day.     • terazosin (HYTRIN) 1 MG Cap Take 1 mg by mouth every evening.     • ondansetron (ZOFRAN ODT) 4 MG TABLET DISPERSIBLE Take 4 mg by mouth every 8 hours as needed for Nausea/Vomiting.     • econazole nitrate 1 % cream Apply  to affected area(s) 2 times a day.     • Diclofenac Sodium 1 % Cream Apply  to skin as directed.     • isosorbide mononitrate SR (IMDUR) 30 MG TABLET SR 24 HR Take 1 Tab by mouth every day. 30 Tab 2   • metoprolol SR (TOPROL XL) 25 MG TABLET SR 24 HR Take 1 Tab by mouth every day. 30 Tab 2   • dutasteride (AVODART) 0.5 MG capsule Take 0.5 mg by mouth every day.     • fenofibrate (TRICOR) 145 MG Tab Take 145 mg by mouth every bedtime.     • rosuvastatin (CRESTOR) 10 MG Tab Take 40 mg by mouth every evening.     • donepezil (ARICEPT) 10 MG tablet Take 10 mg by mouth every bedtime.     • Memantine HCl ER (NAMENDA XR) 21 MG CAPSULE SR 24 HR Take 21 mg by mouth every bedtime.     • fluoxetine (PROZAC) 20 MG Cap Take 40 mg by mouth every bedtime.     • metformin (GLUCOPHAGE) 500 MG Tab Take 500 mg by mouth 2 times a day, with meals.     • baclofen (LIORESAL) 20 MG tablet Take 20-40 mg by mouth See Admin Instructions. 20 mg in AM  40 mg at BEDTIME     • Buprenorphine (BUTRANS) 10 MCG/HR PATCH WEEKLY Apply 15 mcg to skin as directed every Sunday.     • fluticasone (FLONASE) 50 MCG/ACT nasal spray Spray 1 Spray in nose 3  times a day.     • omeprazole (PRILOSEC) 20 MG delayed-release capsule Take 20 mg by mouth 2 times a day.     • Omega-3 Fatty Acids (FISH OIL) 1000 MG Cap capsule Take 1,000 mg by mouth every day.     • aspirin EC (ECOTRIN) 81 MG Tablet Delayed Response Take 81 mg by mouth every day.     • B Complex Vitamins (VITAMIN B COMPLEX PO) Take 1 Tab by mouth every day.     • Potassium Gluconate 550 MG Tab Take 1 Tab by mouth every day.       No current facility-administered medications for this visit.        PHYSICAL EXAM:  Appearance: Well-developed, well-nourished, no acute distress.  Eyes. PERRL.  Hearing: Grossly intact.  Oropharynx: Tongue normal, posterior pharynx without erythema or exudate.  Respiratory effort: No intercostal retractions or use of accessory muscles.  Lung auscultation: No crackles, wheezing.  Heart auscultation: No murmur, gallop, or rub. Regular rate and rhythm.  Extremities: No cyanosis or edema.  Gait and Station: Normal  Orientation: Oriented to time, place, and person.    Assessment:  1. Chronic obstructive pulmonary disease, unspecified COPD type (CMS-HCC)     2. JONATHAN treated with BiPAP           Plan:  1. Continue Spiriva 2.5 µg 2 inhalations daily and pro-air as needed.  2. Continue BiPAP 16/11 cm nightly.  3. Clean equipment weekly and replace lites as allowed by insurance.  4. No smoking.    Return in about 6 months (around 5/8/2018) for With VIRGIL Chavez.

## 2017-11-09 NOTE — PATIENT INSTRUCTIONS
"1. Continue Spiriva 2.5 µg 2 inhalations daily and pro-air as needed.  2. Continue BiPAP 16/11 cm nightly.  3. Clean equipment weekly and replace lites as allowed by insurance.  4. No smoking.          You Can Quit Smoking  If you are ready to quit smoking or are thinking about it, congratulations! You have chosen to help yourself be healthier and live longer! There are lots of different ways to quit smoking. Nicotine gum, nicotine patches, a nicotine inhaler, or nicotine nasal spray can help with physical craving. Hypnosis, support groups, and medicines help break the habit of smoking.  TIPS TO GET OFF AND STAY OFF CIGARETTES  · Learn to predict your moods. Do not let a bad situation be your excuse to have a cigarette. Some situations in your life might tempt you to have a cigarette.  · Ask friends and co-workers not to smoke around you.  · Make your home smoke-free.  · Never have \"just one\" cigarette. It leads to wanting another and another. Remind yourself of your decision to quit.  · On a card, make a list of your reasons for not smoking. Read it at least the same number of times a day as you have a cigarette. Tell yourself everyday, \"I do not want to smoke. I choose not to smoke.\"  · Ask someone at home or work to help you with your plan to quit smoking.  · Have something planned after you eat or have a cup of coffee. Take a walk or get other exercise to perk you up. This will help to keep you from overeating.  · Try a relaxation exercise to calm you down and decrease your stress. Remember, you may be tense and nervous the first two weeks after you quit. This will pass.  · Find new activities to keep your hands busy. Play with a pen, coin, or rubber band. Doodle or draw things on paper.  · Brush your teeth right after eating. This will help cut down the craving for the taste of tobacco after meals. You can try mouthwash too.  · Try gum, breath mints, or diet candy to keep something in your mouth.  IF YOU SMOKE " "AND WANT TO QUIT:  · Do not stock up on cigarettes. Never buy a carton. Wait until one pack is finished before you buy another.  · Never carry cigarettes with you at work or at home.  · Keep cigarettes as far away from you as possible. Leave them with someone else.  · Never carry matches or a lighter with you.  · Ask yourself, \"Do I need this cigarette or is this just a reflex?\"  · Bet with someone that you can quit. Put cigarette money in a [employer] bank every morning. If you smoke, you give up the money. If you do not smoke, by the end of the week, you keep the money.  · Keep trying. It takes 21 days to change a habit!  · Talk to your doctor about using medicines to help you quit. These include nicotine replacement gum, lozenges, or skin patches.     This information is not intended to replace advice given to you by your health care provider. Make sure you discuss any questions you have with your health care provider.     Document Released: 10/14/2010 Document Revised: 03/11/2013 Document Reviewed: 10/14/2010  [employer] Patient Education ©2016 [employer]    "

## 2017-12-26 DIAGNOSIS — R06.02 SHORTNESS OF BREATH: ICD-10-CM

## 2017-12-26 DIAGNOSIS — J44.9 CHRONIC OBSTRUCTIVE PULMONARY DISEASE, UNSPECIFIED COPD TYPE (HCC): ICD-10-CM

## 2017-12-26 NOTE — TELEPHONE ENCOUNTER
Have we ever prescribed this med? Yes.  If yes, what date? 04/19/17    Last OV: 11/08/17 Bak    Next OV: 05/02/18 Mor    DX: COPD/SOB    Medications:   Requested Prescriptions     Pending Prescriptions Disp Refills   • PROAIR  (90 Base) MCG/ACT Aero Soln inhalation aerosol [Pharmacy Med Name: PROAIR  90MCG  INH  HFA] 1 Inhaler 5     Sig: INHALE 2 PUFFS BY MOUTH  EVERY FOUR HOURS AS NEEDED  FOR SHORTNESS OF BREATH.

## 2017-12-27 DIAGNOSIS — J44.9 CHRONIC OBSTRUCTIVE PULMONARY DISEASE, UNSPECIFIED COPD TYPE (HCC): ICD-10-CM

## 2018-01-04 DIAGNOSIS — J44.9 CHRONIC OBSTRUCTIVE PULMONARY DISEASE, UNSPECIFIED COPD TYPE (HCC): ICD-10-CM

## 2018-01-04 DIAGNOSIS — G47.33 OSA (OBSTRUCTIVE SLEEP APNEA): ICD-10-CM

## 2018-01-04 RX ORDER — IPRATROPIUM BROMIDE 21 UG/1
2 SPRAY, METERED NASAL EVERY 12 HOURS
COMMUNITY
End: 2018-01-04 | Stop reason: SDUPTHER

## 2018-01-04 RX ORDER — IPRATROPIUM BROMIDE 21 UG/1
2 SPRAY, METERED NASAL EVERY 12 HOURS
Qty: 90 ML | Refills: 3 | Status: SHIPPED | OUTPATIENT
Start: 2018-01-04 | End: 2019-01-01 | Stop reason: SDUPTHER

## 2018-01-04 NOTE — TELEPHONE ENCOUNTER
[facility] is requesting a new rx for Ipratropium Bromide .03% LANDRY.    Last OV: 11/8/17- [facility]aker  Next OV: 5/2/18  JONATHAN, COPD

## 2018-05-02 ENCOUNTER — OFFICE VISIT (OUTPATIENT)
Dept: PULMONOLOGY | Facility: HOSPICE | Age: 73
End: 2018-05-02
Payer: MEDICARE

## 2018-05-02 VITALS
TEMPERATURE: 97.7 F | HEIGHT: 72 IN | OXYGEN SATURATION: 96 % | SYSTOLIC BLOOD PRESSURE: 118 MMHG | HEART RATE: 71 BPM | BODY MASS INDEX: 27.09 KG/M2 | DIASTOLIC BLOOD PRESSURE: 70 MMHG | RESPIRATION RATE: 16 BRPM | WEIGHT: 200 LBS

## 2018-05-02 DIAGNOSIS — G47.33 OSA (OBSTRUCTIVE SLEEP APNEA): ICD-10-CM

## 2018-05-02 DIAGNOSIS — J30.9 ALLERGIC RHINITIS, UNSPECIFIED SEASONALITY, UNSPECIFIED TRIGGER: ICD-10-CM

## 2018-05-02 DIAGNOSIS — F17.200 TOBACCO DEPENDENCE: ICD-10-CM

## 2018-05-02 DIAGNOSIS — R06.02 SHORTNESS OF BREATH: ICD-10-CM

## 2018-05-02 PROCEDURE — 99214 OFFICE O/P EST MOD 30 MIN: CPT | Performed by: NURSE PRACTITIONER

## 2018-05-02 RX ORDER — RANOLAZINE 500 MG/1
500 TABLET, EXTENDED RELEASE ORAL 2 TIMES DAILY
COMMUNITY

## 2018-05-02 NOTE — PATIENT INSTRUCTIONS
Plan:    1) Continue Bipap @ 16/11 CM H20.   2) Sleep hygiene discussed. Recommend keeping a set sleep/wake schedule. Logging enough hours of sleep. Limiting/Avoiding naps. No caffeine after noon and no heavy meals in the evening. Recommend daily exercise.   3) Continue Spiriva respimat and Albuterol inhalers.   4) Smoking cessation discussed and recommended. He is not interested in quitting at this time. Referral to [facility] lung cancer screening program.   5) Continue Flonase.   6) Follow up in 6 months at the Pulmonary Clinic with compliance card download, sooner if needed.

## 2018-05-02 NOTE — PROGRESS NOTES
Chief Complaint   Patient presents with   • Apnea   • COPD       HPI:  Al Gillis is a 72 y.o. year old male here today for follow-up on his obstructive sleep apnea. Polysomnogram indicated an AHI of 15.3 with a minimum 02 saturation of 85%. She is compliant with BiPAP 16/11 CM H20. Compliance card download today in the office indicates an AHI of 5.6 with an average use of 7 hours at night. He tolerates the pressures. He has a nasal mask which he feels is comfortable. He does feel he sleeps better on therapy and wakes more refreshed. He does have chronic headaches, but does not feel these are sleep related. He has had prior work up for his headaches and they are felt to be related to his chronic neck pain.     He carries a diagnosis of COPD. However Pulmonary function tests from 8/11/16 indicated an FEV1 of 3.19 L, 100% predicted, FEV1/FVC ratio of 74 and a DLCO of 136% predicted. He is compliant with Spiriva 2.5 µg inhalations daily and albuterol HFA as needed. 6 minute walk today showed no indication for oxygenation. O2 saturation was a low of 93% the entire study. Due to increased shortness of breath a CTA was obtained 3/28/17 indicating no PE with bibasilar scarring. There is stable benign-appearing cyst in the anterior mediastinum. The patient has a history of tuberculosis and underwent treatment in 1954. He notes mild dyspnea with exertion. He feels this is unchanged. He notes an occasional cough with a small amount of mucous which he feels is related to his post nasal drip. He denies fevers, chills, sweats, and hemoptysis. He does smoke cigarettes and averages 1 PPD. He is not ready to quit. 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 He states he had quit smoking the in past and it added to increased anxiety.          Past Medical History:   Diagnosis Date   • Anesthesia     CONFUSED/FIGHTING FOR 10 DAYS AFTER SURGERY   • Arthritis    • Breath shortness    • Congestive heart failure  ([facility])    • Dental disorder     dentures   • Diabetes     on oral meds   • Hypertension    • JONATHAN treated with BiPAP 4/27/2016   • Pacemaker    • Pain     back,legs,arms,pain scale 8   • Pneumonia 2004   • Psychiatric problem    • Shortness of breath 8/11/2016   • Sleep apnea     uses bypap   • Snoring    • Tuberculosis     treated for as a teenager       Past Surgical History:   Procedure Laterality Date   • CARPAL TUNNEL RELEASE  5/2/2013    Performed by Leo Penaloza M.D. at SURGERY [facility] ORS   • CARPAL TUNNEL RELEASE  4/18/2013    Performed by Leo Penaloza M.D. at SURGERY [facility] ORS   • RECOVERY  11/30/2010    Performed by SURGERY, [facility]-RECOVERY at SURGERY SAME DAY [facility]   • LUMBAR DECOMPRESSION  1/29/2010    Performed by LEO PENALOZA at SURGERY [facility] ORS   • LUMBAR LAMINECTOMY DISKECTOMY  1/29/2010    Performed by LEO PENALOZA at SURGERY [facility] ORS   • FORAMINOTOMY  1/29/2010    Performed by LEO PENALOZA at SURGERY [facility] ORS   • HIP ARTHROPLASTY TOTAL  10/6/2006    right   • LIPOMA EXCISION  2005   • PB REPAIR OF JOSSUEE,ONE  2001    right foot   # 2,3,4,5   • OTHER CARDIAC SURGERY  1/22/97    quadruple bypass   • ROTATOR CUFF REPAIR  12/8/06,10/6/07    bilateral,with muscle repair   • STENT PLACEMENT  10/28/19       Family History   Problem Relation Age of Onset   • Cancer Mother        Social History     Social History   • Marital status:      Spouse name: N/A   • Number of children: N/A   • Years of education: N/A     Occupational History   • Not on file.     Social History Main Topics   • Smoking status: Current Every Day Smoker     Packs/day: 3.00     Years: 44.00     Types: Cigarettes     Last attempt to quit: 1/12/2008   • Smokeless tobacco: Former User     Types: Chew     Quit date: 1/1/1975      Comment: 3 ppd 45 yrs,quit 8/31/08   • Alcohol use No   • Drug use: No   • Sexual activity: Not on file     Other Topics Concern   • Not on file      Social History Narrative   • No narrative on file       ROS:  Constitutional: Denies fevers, chills, sweats, weight loss  Eyes: Denies vision loss, pain, drainage, double vision. Wears glasses   Ears/Nose/Mouth/Throat: Denies ear ache, difficulty hearing, sore throat, persistent hoarseness, decayed teeth/toothache. Positive for rhinitis   Cardiovascular: Denies chest pain, tightness, palpitations, swelling in feet/legs, fainting, difficulty breathing when laying down  Respiratory: See HPI   GI: Denies heartburn, difficulty swallowing, nausea, vomiting, abdominal pain, diarrhea, constipation  : Denies frequent urination, painful urination  Integumentary: Denies rashes, lumps or color changes  MSK: Positive for neck pain   Neurological: Denies dizziness, weakness. Positive for headaches.   Sleep: See HPI       Current Outpatient Prescriptions   Medication Sig Dispense Refill   • ranolazine (RANEXA) 500 MG TABLET SR 12 HR Take 500 mg by mouth 2 times a day.     • Tiotropium Bromide Monohydrate (SPIRIVA RESPIMAT) 2.5 MCG/ACT Aero Soln Inhale 2 Puffs by mouth every day. 3 Inhaler 3   • PROAIR  (90 Base) MCG/ACT Aero Soln inhalation aerosol INHALE 2 PUFFS BY MOUTH  EVERY FOUR HOURS AS NEEDED  FOR SHORTNESS OF BREATH. 3 Inhaler 3   • Buprenorphine 15 MCG/HR PATCH WEEKLY   0   • gabapentin (NEURONTIN) 300 MG Cap   0   • potassium chloride (MICRO-K) 10 MEQ capsule Take 10 mEq by mouth 2 times a day.     • clopidogrel (PLAVIX) 75 MG Tab Take 75 mg by mouth every day.     • ondansetron (ZOFRAN ODT) 4 MG TABLET DISPERSIBLE Take 4 mg by mouth every 8 hours as needed for Nausea/Vomiting.     • isosorbide mononitrate SR (IMDUR) 30 MG TABLET SR 24 HR Take 1 Tab by mouth every day. 30 Tab 2   • metoprolol SR (TOPROL XL) 25 MG TABLET SR 24 HR Take 1 Tab by mouth every day. 30 Tab 2   • fenofibrate (TRICOR) 145 MG Tab Take 145 mg by mouth every bedtime.     • rosuvastatin (CRESTOR) 10 MG Tab Take 40 mg by mouth every  "evening.     • donepezil (ARICEPT) 10 MG tablet Take 10 mg by mouth every bedtime.     • Memantine HCl ER (NAMENDA XR) 21 MG CAPSULE SR 24 HR Take 21 mg by mouth every bedtime.     • fluoxetine (PROZAC) 20 MG Cap Take 40 mg by mouth every bedtime.     • baclofen (LIORESAL) 20 MG tablet Take 20-40 mg by mouth See Admin Instructions. 20 mg in AM  40 mg at BEDTIME     • fluticasone (FLONASE) 50 MCG/ACT nasal spray Spray 1 Spray in nose 3 times a day.     • omeprazole (PRILOSEC) 20 MG delayed-release capsule Take 20 mg by mouth 2 times a day.     • aspirin EC (ECOTRIN) 81 MG Tablet Delayed Response Take 81 mg by mouth every day.     • ipratropium (ATROVENT) 0.03 % Solution Spray 2 Sprays in nose every 12 hours. 90 mL 3   • Diclofenac Sodium 1 % Gel every day.  0   • econazole nitrate 1 % cream Apply  to affected area(s) 2 times a day.     • dutasteride (AVODART) 0.5 MG capsule Take 0.5 mg by mouth every day.     • Potassium Gluconate 550 MG Tab Take 1 Tab by mouth every day.       No current facility-administered medications for this visit.        Allergies   Allergen Reactions   • Tape      Paper tape ok,coban ok   • Valium      Psychological changes,'history of addiction to valium'       Blood pressure 118/70, pulse 71, temperature 36.5 °C (97.7 °F), resp. rate 16, height 1.829 m (6' 0.01\"), weight 90.7 kg (200 lb), SpO2 96 %.    PE:   Appearance: Well developed, well nourished, no acute distress  Eyes: PERRL, EOM intact, sclera white, conjunctiva moist  Ears: no lesions or deformities  Hearing: grossly intact  Nose: no lesions or deformities  Oropharynx: tongue normal, posterior pharynx without erythema or exudate  Mallampati Classification: Class 4  Neck: supple, trachea midline, no masses   Respiratory effort: no intercostal retractions or use of accessory muscles  Lung auscultation: no rales, rhonchi or wheezes  Heart auscultation: no murmur rub or gallop  Extremities: no cyanosis or edema  Abdomen: soft ,non " tender, no masses  Gait and Station: Ambulates with cane   Digits and nails: no clubbing, cyanosis, petechiae or nodes.  Cranial nerves: grossly intact  Skin: no rashes, lesions or ulcers noted  Orientation: Oriented to time, person and place  Mood and affect: mood and affect appropriate, normal interaction with examiner  Judgement: Intact          Assessment:  1. JONATHAN (obstructive sleep apnea)     2. Shortness of breath     3. Tobacco dependence  REFERRAL TO LUNG CANCER SCREENING PROGRAM   4. Allergic rhinitis, unspecified seasonality, unspecified trigger           Plan:    1) Continue Bipap @ 16/11 CM H20.   2) Sleep hygiene discussed. Recommend keeping a set sleep/wake schedule. Logging enough hours of sleep. Limiting/Avoiding naps. No caffeine after noon and no heavy meals in the evening. Recommend daily exercise.   3) Continue Spiriva respimat and Albuterol inhalers.   4) Smoking cessation discussed and recommended. He is not interested in quitting at this time. Referral to [facility] lung cancer screening program.   5) Continue Flonase.   6) Follow up in 6 months at the Pulmonary Clinic with compliance card download, sooner if needed.

## 2018-09-09 DIAGNOSIS — J44.9 CHRONIC OBSTRUCTIVE PULMONARY DISEASE, UNSPECIFIED COPD TYPE (HCC): ICD-10-CM

## 2018-09-09 DIAGNOSIS — R06.02 SHORTNESS OF BREATH: ICD-10-CM

## 2018-09-10 NOTE — TELEPHONE ENCOUNTER
Have we ever prescribed this med? Yes.  If yes, what date? 12/26/17    Last OV: 5/2/18    Next OV: 11/1/18    DX: Shortness of breath (R06.02); Chronic obstructive pulmonary disease, unspecified COPD type (HCC) (J44.9)    Medications: PROAIR  (90 Base) MCG/ACT Aero Soln inhalation aerosol

## 2018-11-01 ENCOUNTER — TELEPHONE (OUTPATIENT)
Dept: PULMONOLOGY | Facility: HOSPICE | Age: 73
End: 2018-11-01

## 2018-11-01 ENCOUNTER — OFFICE VISIT (OUTPATIENT)
Dept: PULMONOLOGY | Facility: HOSPICE | Age: 73
End: 2018-11-01
Payer: MEDICARE

## 2018-11-01 VITALS
TEMPERATURE: 97.6 F | WEIGHT: 200 LBS | HEART RATE: 78 BPM | OXYGEN SATURATION: 94 % | SYSTOLIC BLOOD PRESSURE: 130 MMHG | BODY MASS INDEX: 27.09 KG/M2 | DIASTOLIC BLOOD PRESSURE: 68 MMHG | HEIGHT: 72 IN | RESPIRATION RATE: 16 BRPM

## 2018-11-01 DIAGNOSIS — J30.9 ALLERGIC RHINITIS, UNSPECIFIED SEASONALITY, UNSPECIFIED TRIGGER: ICD-10-CM

## 2018-11-01 DIAGNOSIS — Z23 NEED FOR INFLUENZA VACCINATION: ICD-10-CM

## 2018-11-01 DIAGNOSIS — G47.33 OSA (OBSTRUCTIVE SLEEP APNEA): ICD-10-CM

## 2018-11-01 DIAGNOSIS — J44.9 CHRONIC OBSTRUCTIVE PULMONARY DISEASE, UNSPECIFIED COPD TYPE (HCC): ICD-10-CM

## 2018-11-01 DIAGNOSIS — F17.200 TOBACCO DEPENDENCE: ICD-10-CM

## 2018-11-01 PROCEDURE — 99214 OFFICE O/P EST MOD 30 MIN: CPT | Mod: 25 | Performed by: NURSE PRACTITIONER

## 2018-11-01 PROCEDURE — G0008 ADMIN INFLUENZA VIRUS VAC: HCPCS | Performed by: NURSE PRACTITIONER

## 2018-11-01 PROCEDURE — 90662 IIV NO PRSV INCREASED AG IM: CPT | Performed by: NURSE PRACTITIONER

## 2018-11-01 RX ORDER — AZELASTINE HYDROCHLORIDE, FLUTICASONE PROPIONATE 137; 50 UG/1; UG/1
1 SPRAY, METERED NASAL 2 TIMES DAILY
Qty: 3 BOTTLE | Refills: 3 | Status: SHIPPED | OUTPATIENT
Start: 2018-11-01 | End: 2019-01-01 | Stop reason: SDUPTHER

## 2018-11-01 NOTE — PATIENT INSTRUCTIONS
Plan:    1) Continue Bipap @ 16/11 CM H20. AHI is a little elevated. We discussed increasing his pressure. He feels elevated AHI may be related to mask leaks. He is going to update his mask and supplies and be more consistent with cleaning his equipment. Arrange for repeat download in 3 months. If remains elevated will increase pressures.   2) Sleep hygiene discussed.   3) Weight loss recommended.  4) Smoking cessation recommended. He declined referral to [facility] lung cancer screening program in the past.   5) Continue Spiriva Respimat daily and Proair HFA inhaler as needed.  6) Change Flonase to Dymista nasal spray 1 spray/nostril bid.   7) He is up to date on Prevnar 13 and Pneumovax 23 vaccinations. Update Influenza vaccine today in the office.  8) Follow up in 3 months with repeat compliance card download, sooner OV if needed.

## 2018-11-01 NOTE — PROGRESS NOTES
Chief Complaint   Patient presents with   • COPD     Last Seen 5/21/18       HPI:  Al Gillis is a 73 y.o. year old male here today for follow-up on his obstructive sleep apnea and COPD.     Polysomnogram indicated an AHI of 15.3 with a minimum 02 saturation of 85%. She is compliant with BiPAP 16/11 CM H20. Compliance card download today in the office indicates an AHI of 14.5 with an average use of 8 hours at night. Download at his last office visit indicated an AHI of 5.6. He does have evidence of a mask leak. He has a nasal mask. He denies excessive leaks. He does use a chin strap, but notes dry mouth in the morning. He states he is unable to wear a full face mask. He does feel he tolerates the pressure well. He does feel he sleeps better on therapy. He does feel energy levels are better on therapy.  He does have chronic headaches, but does not feel these are sleep related. He has had prior work up for his headaches and they are felt to be related to his chronic neck pain.      He carries a diagnosis of COPD. However Pulmonary function tests from 8/11/16 indicated an FEV1 of 3.19 L, 100% predicted, FEV1/FVC ratio of 74 and a DLCO of 136% predicted. He is compliant with Spiriva 2.5 µg inhalations daily and albuterol HFA as needed. 6 minute walk in the past showed no indication for oxygenation. O2 saturation was a low of 93% the entire study. Due to increased shortness of breath a CTA was obtained 3/28/17 indicating no PE with bibasilar scarring. There is stable benign-appearing cyst in the anterior mediastinum. The patient has a history of tuberculosis and underwent treatment in 1954. He does smoke cigarettes and averages 1 PPD. He is not ready to quit. 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 He states he had quit smoking the in past and it added to increased anxiety.   He feels his dyspnea has improved significantly. He notes occasional cough with clear mucous which he feels is related  to post nasal drip. He notes an occasional wheeze. He denies any fevers or chills. He rarely requires use of his Proair for acute symptoms. He no longer feels the Flonase helps.         Past Medical History:   Diagnosis Date   • Anesthesia     CONFUSED/FIGHTING FOR 10 DAYS AFTER SURGERY   • Arthritis    • Breath shortness    • Congestive heart failure (HCC)    • Dental disorder     dentures   • Diabetes     on oral meds   • Hypertension    • JONATHAN treated with BiPAP 4/27/2016   • Pacemaker    • Pain     back,legs,arms,pain scale 8   • Pneumonia 2004   • Psychiatric problem    • Shortness of breath 8/11/2016   • Sleep apnea     uses bypap   • Snoring    • Tuberculosis     treated for as a teenager       Past Surgical History:   Procedure Laterality Date   • CARPAL TUNNEL RELEASE  5/2/2013    Performed by Leo Penaloza M.D. at SUR[facility]ERY [facility]   • CARPAL TUNNEL RELEASE  4/18/2013    Performed by Leo Penaloza M.D. at SURGERY [facility]   • RECOVERY  11/30/2010    [facility]   • LUMBAR DECOMPRESSION  1/29/2010    Performed by LEO PENALOZA at SURGERY [facility]S   • LUMBAR LAMINECTOMY DISKECTOMY  1/29/2010    Performed by LEO PENALOZA at SURGERY [facility]S   • FORAMINOTOMY  1/29/2010    Performed by LEO PENALOZA at [facility] ORS   • HIP ARTHROPLASTY TOTAL  10/6/2006    right   • LIPOMA EXCISION  2005   • PB REPAIR OF HAMMERTOE,ONE  2001    right foot   # 2,3,4,5   • OTHER CARDIAC SURGERY  1/22/97    quadruple bypass   • ROTATOR CUFF REPAIR  12/8/06,10/6/07    bilateral,with muscle repair   • STENT PLACEMENT  10/28/19       Family History   Problem Relation Age of Onset   • Cancer Mother        Social History     Social History   • Marital status:      Spouse name: N/A   • Number of children: N/A   • Years of education: N/A     Occupational History   • Not on file.     Social History Main Topics   • Smoking status:  Current Every Day Smoker     Packs/day: 3.00     Years: 44.00     Types: Cigarettes     Last attempt to quit: 1/12/2008   • Smokeless tobacco: Former User     Types: Chew     Quit date: 1/1/1975      Comment: 3 ppd 45 yrs,quit 8/31/08   • Alcohol use No   • Drug use: No   • Sexual activity: Not on file     Other Topics Concern   • Not on file     Social History Narrative   • No narrative on file       ROS:  Constitutional: Denies fevers, chills, sweats, weight loss  Eyes: Denies glasses, vision loss, pain, drainage, double vision  Ears/Nose/Mouth/Throat: Denies ear ache, difficulty hearing, sore throat, persistent hoarseness, decayed teeth/toothache  Cardiovascular: Denies chest pain, tightness, palpitations, swelling in feet/legs, fainting, difficulty breathing when laying down  Respiratory: See HPI   GI: Denies heartburn, difficulty swallowing, nausea, vomiting, abdominal pain, diarrhea, constipation  : Denies frequent urination, painful urination  Integumentary: Denies rashes, lumps or color changes  MSK: Denies painful joints, sore muscles, and back pain.   Neurological: Denies frequent headaches, dizziness, weakness  Sleep: See HPI       Current Outpatient Prescriptions   Medication Sig Dispense Refill   • ranolazine (RANEXA) 500 MG TABLET SR 12 HR Take 500 mg by mouth 2 times a day.     • ipratropium (ATROVENT) 0.03 % Solution Spray 2 Sprays in nose every 12 hours. 90 mL 3   • Tiotropium Bromide Monohydrate (SPIRIVA RESPIMAT) 2.5 MCG/ACT Aero Soln Inhale 2 Puffs by mouth every day. 3 Inhaler 3   • Buprenorphine 15 MCG/HR PATCH WEEKLY   0   • Diclofenac Sodium 1 % Gel every day.  0   • gabapentin (NEURONTIN) 300 MG Cap   0   • potassium chloride (MICRO-K) 10 MEQ capsule Take 10 mEq by mouth 2 times a day.     • clopidogrel (PLAVIX) 75 MG Tab Take 75 mg by mouth every day.     • ondansetron (ZOFRAN ODT) 4 MG TABLET DISPERSIBLE Take 4 mg by mouth every 8 hours as needed for Nausea/Vomiting.     • econazole  nitrate 1 % cream Apply  to affected area(s) 2 times a day.     • isosorbide mononitrate SR (IMDUR) 30 MG TABLET SR 24 HR Take 1 Tab by mouth every day. 30 Tab 2   • metoprolol SR (TOPROL XL) 25 MG TABLET SR 24 HR Take 1 Tab by mouth every day. 30 Tab 2   • dutasteride (AVODART) 0.5 MG capsule Take 0.5 mg by mouth every day.     • rosuvastatin (CRESTOR) 10 MG Tab Take 40 mg by mouth every evening.     • donepezil (ARICEPT) 10 MG tablet Take 10 mg by mouth every bedtime.     • Memantine HCl ER (NAMENDA XR) 21 MG CAPSULE SR 24 HR Take 21 mg by mouth every bedtime.     • fluoxetine (PROZAC) 20 MG Cap Take 40 mg by mouth every bedtime.     • baclofen (LIORESAL) 20 MG tablet Take 20-40 mg by mouth See Admin Instructions. 20 mg in AM  40 mg at BEDTIME     • omeprazole (PRILOSEC) 20 MG delayed-release capsule Take 20 mg by mouth 2 times a day.     • aspirin EC (ECOTRIN) 81 MG Tablet Delayed Response Take 81 mg by mouth every day.     • PROAIR  (90 Base) MCG/ACT Aero Soln inhalation aerosol INHALE 2 PUFFS BY MOUTH  EVERY FOUR HOURS AS NEEDED  FOR SHORTNESS OF BREATH. (Patient not taking: Reported on 11/1/2018) 3 Inhaler 3   • fenofibrate (TRICOR) 145 MG Tab Take 145 mg by mouth every bedtime.     • fluticasone (FLONASE) 50 MCG/ACT nasal spray Spray 1 Spray in nose 3 times a day.     • Potassium Gluconate 550 MG Tab Take 1 Tab by mouth every day.       No current facility-administered medications for this visit.        Allergies   Allergen Reactions   • Tape      Paper tape ok,coban ok   • Valium      Psychological changes,'history of addiction to valium'       Blood pressure 130/68, pulse 78, temperature 36.4 °C (97.6 °F), temperature source Temporal, resp. rate 16, height 1.829 m (6'), weight 90.7 kg (200 lb), SpO2 94 %.    PE:   Appearance: Well developed, well nourished, no acute distress  Eyes: PERRL, EOM intact, sclera white, conjunctiva moist  Ears: no lesions or deformities  Hearing: grossly intact  Nose: no  lesions or deformities  Oropharynx: tongue normal, posterior pharynx without erythema or exudate  Mallampati Classification: Class 4  Neck: supple, trachea midline, no masses   Respiratory effort: no intercostal retractions or use of accessory muscles  Lung auscultation: no rales, rhonchi or wheezes  Heart auscultation: no murmur rub or gallop  Extremities: no cyanosis or edema  Abdomen: soft ,non tender, no masses  Gait and Station: normal  Digits and nails: no clubbing, cyanosis, petechiae or nodes.  Cranial nerves: grossly intact  Skin: no rashes, lesions or ulcers noted  Orientation: Oriented to time, person and place  Mood and affect: mood and affect appropriate, normal interaction with examiner  Judgement: Intact          Assessment:    1. JONATHAN (obstructive sleep apnea)     2. Chronic obstructive pulmonary disease, unspecified COPD type (HCC)     3. Tobacco dependence     4. Allergic rhinitis, unspecified seasonality, unspecified trigger  Azelastine-Fluticasone (DYMISTA) 137-50 MCG/ACT Suspension   5. Need for influenza vaccination  Influenza Vaccine, High Dose (65+ Only)         Plan:    1) Continue Bipap @ 16/11 CM H20. AHI is a little elevated. We discussed increasing his pressure. He feels elevated AHI may be related to mask leaks. He is going to update his mask and supplies and be more consistent with cleaning his equipment. Arrange for repeat download in 3 months. If remains elevated will increase pressures.   2) Sleep hygiene discussed.   3) Weight loss recommended.  4) Smoking cessation recommended. He declined referral to [facility]'s lung cancer screening program in the past.   5) Continue Spiriva Respimat daily and Proair HFA inhaler as needed.  6) Change Flonase to Dymista nasal spray 1 spray/nostril bid.   7) He is up to date on Prevnar 13 and Pneumovax 23 vaccinations. Update Influenza vaccine today in the office.  8) Follow up in 3 months with repeat compliance card download, sooner OV if needed.

## 2018-11-01 NOTE — TELEPHONE ENCOUNTER
Patient due in 3mos, which is [id number], I offered multiple days in early feb and they were declined. I offered 2/20/19, wife was concerned that was too far. She requested to come back in jan instead, which would be 2mos. Is 2mos ok or is 3mos and two weeks additonal ok?

## 2018-11-02 DIAGNOSIS — J44.9 CHRONIC OBSTRUCTIVE PULMONARY DISEASE, UNSPECIFIED COPD TYPE (HCC): ICD-10-CM

## 2018-11-02 NOTE — TELEPHONE ENCOUNTER
Have we ever prescribed this med? Yes.  If yes, what date? 12/27/2017    Last OV: 11/01/2018-Dennis     Next OV: 01/24/2019    DX: Chronic obstructive pulmonary disease, unspecified COPD type (HCC) (J44.9)    Medications:   Requested Prescriptions     Pending Prescriptions Disp Refills   • SPIRIVA RESPIMAT 2.5 MCG/ACT Aero Soln [Pharmacy Med Name: SPIRIVA RESPIMAT 2.5MCG/ACT 30DS] 12 g      Sig: INHALE 2 PUFFS BY MOUTH  EVERY DAY.

## 2018-11-06 RX ORDER — TIOTROPIUM BROMIDE INHALATION SPRAY 3.12 UG/1
2 SPRAY, METERED RESPIRATORY (INHALATION) DAILY
Qty: 3 INHALER | Refills: 3 | Status: SHIPPED | OUTPATIENT
Start: 2018-11-06 | End: 2019-01-01 | Stop reason: SDUPTHER

## 2019-01-01 ENCOUNTER — TELEPHONE (OUTPATIENT)
Dept: PULMONOLOGY | Facility: HOSPICE | Age: 74
End: 2019-01-01

## 2019-01-01 ENCOUNTER — NON-PROVIDER VISIT (OUTPATIENT)
Dept: PULMONOLOGY | Facility: HOSPICE | Age: 74
End: 2019-01-01
Attending: NURSE PRACTITIONER
Payer: MEDICARE

## 2019-01-01 ENCOUNTER — OFFICE VISIT (OUTPATIENT)
Dept: PULMONOLOGY | Facility: HOSPICE | Age: 74
End: 2019-01-01
Payer: MEDICARE

## 2019-01-01 VITALS
OXYGEN SATURATION: 96 % | SYSTOLIC BLOOD PRESSURE: 110 MMHG | RESPIRATION RATE: 16 BRPM | DIASTOLIC BLOOD PRESSURE: 74 MMHG | HEART RATE: 60 BPM | HEIGHT: 70 IN | WEIGHT: 215 LBS | BODY MASS INDEX: 30.78 KG/M2

## 2019-01-01 VITALS — BODY MASS INDEX: 29.16 KG/M2 | WEIGHT: 215 LBS

## 2019-01-01 DIAGNOSIS — J44.9 CHRONIC OBSTRUCTIVE PULMONARY DISEASE, UNSPECIFIED COPD TYPE (HCC): ICD-10-CM

## 2019-01-01 DIAGNOSIS — R06.02 SHORTNESS OF BREATH: ICD-10-CM

## 2019-01-01 DIAGNOSIS — J30.9 ALLERGIC RHINITIS, UNSPECIFIED SEASONALITY, UNSPECIFIED TRIGGER: ICD-10-CM

## 2019-01-01 DIAGNOSIS — G47.33 OSA (OBSTRUCTIVE SLEEP APNEA): ICD-10-CM

## 2019-01-01 PROCEDURE — 94726 PLETHYSMOGRAPHY LUNG VOLUMES: CPT | Performed by: INTERNAL MEDICINE

## 2019-01-01 PROCEDURE — 94729 DIFFUSING CAPACITY: CPT | Performed by: INTERNAL MEDICINE

## 2019-01-01 PROCEDURE — 94060 EVALUATION OF WHEEZING: CPT | Performed by: INTERNAL MEDICINE

## 2019-01-01 PROCEDURE — 99214 OFFICE O/P EST MOD 30 MIN: CPT | Performed by: PHYSICIAN ASSISTANT

## 2019-01-01 RX ORDER — AZELASTINE HYDROCHLORIDE, FLUTICASONE PROPIONATE 137; 50 UG/1; UG/1
1 SPRAY, METERED NASAL 2 TIMES DAILY
Qty: 3 BOTTLE | Refills: 3 | Status: SHIPPED | OUTPATIENT
Start: 2019-01-01

## 2019-01-01 RX ORDER — IPRATROPIUM BROMIDE 21 UG/1
SPRAY, METERED NASAL
Qty: 1 BOTTLE | Refills: 5 | Status: SHIPPED | OUTPATIENT
Start: 2019-01-01

## 2019-01-01 RX ORDER — ALBUTEROL SULFATE 90 UG/1
2 AEROSOL, METERED RESPIRATORY (INHALATION) EVERY 4 HOURS PRN
Qty: 3 INHALER | Refills: 3 | Status: SHIPPED | OUTPATIENT
Start: 2019-01-01

## 2019-01-01 ASSESSMENT — ENCOUNTER SYMPTOMS
TREMORS: 0
DIAPHORESIS: 0
CLAUDICATION: 0
WHEEZING: 1
HEADACHES: 1
FEVER: 0
WEIGHT LOSS: 0
HEARTBURN: 1
EYES NEGATIVE: 1
COUGH: 1
ROS GI COMMENTS: UPPER AND LOWER DENTURES, NO DIFFICULTY SWALLOWING
CHILLS: 0
PALPITATIONS: 0
SORE THROAT: 1
SPUTUM PRODUCTION: 0
SINUS PAIN: 1
SHORTNESS OF BREATH: 1
ORTHOPNEA: 0
DIZZINESS: 1
INSOMNIA: 1

## 2019-01-01 ASSESSMENT — PULMONARY FUNCTION TESTS
FEV1/FVC: 74
FVC_PERCENT_PREDICTED: 97
FVC_PERCENT_PREDICTED: 98
FEV1_PERCENT_CHANGE: 4
FVC: 4.17
FEV1/FVC: 77.97
FEV1/FVC_PERCENT_CHANGE: 5
FEV1_PERCENT_PREDICTED: 105
FEV1: 3.08
FEV1_PERCENT_PREDICTED: 100
FEV1/FVC_PERCENT_PREDICTED: 73
FVC_PREDICTED: 4.23
FEV1/FVC_PERCENT_PREDICTED: 102
FVC: 4.13
FEV1_LLN: 2.56
FEV1/FVC_PERCENT_PREDICTED: 108
FEV1/FVC_PERCENT_LLN: 61
FEV1_PREDICTED: 3.07
FEV1/FVC: 74
FEV1/FVC_PERCENT_PREDICTED: 101
FVC_LLN: 3.54
FEV1/FVC: 78
FEV1/FVC_PREDICTED: 73
FEV1: 3.22
FEV1/FVC_PERCENT_PREDICTED: 107
FEV1_PERCENT_CHANGE: 0

## 2019-01-24 ENCOUNTER — OFFICE VISIT (OUTPATIENT)
Dept: PULMONOLOGY | Facility: HOSPICE | Age: 74
End: 2019-01-24
Payer: MEDICARE

## 2019-01-24 VITALS
SYSTOLIC BLOOD PRESSURE: 120 MMHG | RESPIRATION RATE: 16 BRPM | WEIGHT: 214 LBS | HEIGHT: 72 IN | TEMPERATURE: 97 F | DIASTOLIC BLOOD PRESSURE: 72 MMHG | HEART RATE: 71 BPM | OXYGEN SATURATION: 95 % | BODY MASS INDEX: 28.99 KG/M2

## 2019-01-24 DIAGNOSIS — J44.9 CHRONIC OBSTRUCTIVE PULMONARY DISEASE, UNSPECIFIED COPD TYPE (HCC): ICD-10-CM

## 2019-01-24 DIAGNOSIS — G47.33 OSA (OBSTRUCTIVE SLEEP APNEA): ICD-10-CM

## 2019-01-24 DIAGNOSIS — F17.200 TOBACCO DEPENDENCE: ICD-10-CM

## 2019-01-24 DIAGNOSIS — J30.9 ALLERGIC RHINITIS, UNSPECIFIED SEASONALITY, UNSPECIFIED TRIGGER: ICD-10-CM

## 2019-01-24 PROCEDURE — 99214 OFFICE O/P EST MOD 30 MIN: CPT | Performed by: NURSE PRACTITIONER

## 2019-01-24 NOTE — PROGRESS NOTES
Chief Complaint   Patient presents with   • COPD   • Apnea     last seen 11/1/18       HPI:  Al Gillis is a 73 y.o. year old male here today for follow-up on his obstructive sleep apnea and COPD.      Polysomnogram indicated an AHI of 15.3 with a minimum 02 saturation of 85%. She is compliant with BiPAP 16/11 CM H20. Compliance card download at his last office visit indicated an AHI of 14.5 with an average use of 8 hours at night. He did have evidence of a mask a mask leak. He has a nasal mask. He does use a chin strap, but notes dry mouth in the morning. He states he is unable to wear a full face mask. He was ordered updated supplies at his last office visit. Repeat compliance download today in the office indicates an AHI of 18.9 with an average use of 9 hours at night. He has had a 15 pound weight gain since his last office visit. He feels his pressure is too low. He feels his current mask is a good fit. He does feel he sleeps better on therapy. He denies significant daytime fatigue.   He does have chronic headaches, but does not feel these are sleep related. He has had prior work up for his headaches and they are felt to be related to his chronic neck pain.      He carries a diagnosis of COPD. However Pulmonary function tests from 8/11/16 indicated an FEV1 of 3.19 L, 100% predicted, FEV1/FVC ratio of 74 and a DLCO of 136% predicted. He is compliant with Spiriva 2.5 µg inhalations daily and albuterol HFA as needed. 6 minute walk in the past showed no indication for oxygenation. O2 saturation was a low of 93% the entire study. Due to increased shortness of breath a CTA was obtained 3/28/17 indicating no PE with bibasilar scarring. There is stable benign-appearing cyst in the anterior mediastinum. The patient has a history of tuberculosis and underwent treatment in 1954. He does smoke cigarettes and averages 1 PPD. He is not ready to quit. 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 He  states he had quit smoking the in past and it added to increased anxiety.   He does have post nasal drip. He did not feel the Flonase helped anymore. He was switched to Dymista at his last office visit. He is using the Dymista nasal spray, but unsure of whether or not it is working better. He notes mild dyspnea with exertion. He denies current cough or mucous. He notes an occasional wheeze. He denies fevers or chills.       Past Medical History:   Diagnosis Date   • Anesthesia     CONFUSED/FIGHTING FOR 10 DAYS AFTER SURGERY   • Arthritis    • Breath shortness    • Congestive heart failure (HCC)    • Dental disorder     dentures   • Diabetes     on oral meds   • Hypertension    • JONATHAN treated with BiPAP 4/27/2016   • Pacemaker    • Pain     back,legs,arms,pain scale 8   • Pneumonia 2004   • Psychiatric problem    • Shortness of breath 8/11/2016   • Sleep apnea     uses bypap   • Snoring    • Tuberculosis     treated for as a teenager       Past Surgical History:   Procedure Laterality Date   • CARPAL TUNNEL RELEASE  5/2/2013    Performed by Leo Penaloza M.D. at [facility]   • CARPAL TUNNEL RELEASE  4/18/2013    Performed by Leo Penaloza M.D. at [facility]   • RECOVERY  11/30/2010    Performed by [facility]   • LUMBAR DECOMPRESSION  1/29/2010    Performed by LEO PENALOZA at [facility]   • LUMBAR LAMINECTOMY DISKECTOMY  1/29/2010    Performed by LEO PENALOZA at [facility]S   • FORAMINOTOMY  1/29/2010    Performed by LEO PENALOZA at [facility]   • HIP ARTHROPLASTY TOTAL  10/6/2006    right   • LIPOMA EXCISION  2005   • PB REPAIR OF HAMMERTOE,ONE  2001    right foot   # 2,3,4,5   • OTHER CARDIAC SURGERY  1/22/97    quadruple bypass   • ROTATOR CUFF REPAIR  12/8/06,10/6/07    bilateral,with muscle repair   • STENT PLACEMENT  10/28/19       Family History   Problem Relation Age of Onset   • Cancer  Mother        Social History     Social History   • Marital status:      Spouse name: N/A   • Number of children: N/A   • Years of education: N/A     Occupational History   • Not on file.     Social History Main Topics   • Smoking status: Current Every Day Smoker     Packs/day: 3.00     Years: 44.00     Types: Cigarettes     Last attempt to quit: 1/12/2008   • Smokeless tobacco: Former User     Types: Chew     Quit date: 1/1/1975      Comment: 3 ppd 45 yrs,quit 8/31/08   • Alcohol use No   • Drug use: No   • Sexual activity: Not on file     Other Topics Concern   • Not on file     Social History Narrative   • No narrative on file       ROS:  Constitutional: Denies fevers, chills, sweats, weight loss  Eyes: Denies vision loss, pain, drainage, double vision. Wears glasses   Ears/Nose/Mouth/Throat: Denies ear ache, difficulty hearing, sore throat, persistent hoarseness, decayed teeth/toothache  Cardiovascular: Denies chest pain, tightness, palpitations, swelling in feet/legs, fainting, difficulty breathing when laying down  Respiratory: See HPI   GI: Denies heartburn, difficulty swallowing, nausea, vomiting, abdominal pain, diarrhea, constipation  : Denies frequent urination, painful urination  Integumentary: Denies rashes, lumps or color changes  MSK: Positive for neck pain   Neurological: Denies frequent headaches, dizziness, weakness  Sleep: See HPI       Current Outpatient Prescriptions   Medication Sig Dispense Refill   • SPIRIVA RESPIMAT 2.5 MCG/ACT Aero Soln INHALE 2 PUFFS BY MOUTH  EVERY DAY. 3 Inhaler 3   • Azelastine-Fluticasone (DYMISTA) 137-50 MCG/ACT Suspension Spray 1 Spray in nose 2 Times a Day. Each nostril. 3 Bottle 3   • PROAIR  (90 Base) MCG/ACT Aero Soln inhalation aerosol INHALE 2 PUFFS BY MOUTH  EVERY FOUR HOURS AS NEEDED  FOR SHORTNESS OF BREATH. 3 Inhaler 3   • ranolazine (RANEXA) 500 MG TABLET SR 12 HR Take 500 mg by mouth 2 times a day.     • ipratropium (ATROVENT) 0.03 %  Solution Spray 2 Sprays in nose every 12 hours. 90 mL 3   • Buprenorphine 15 MCG/HR PATCH WEEKLY   0   • Diclofenac Sodium 1 % Gel every day.  0   • gabapentin (NEURONTIN) 300 MG Cap   0   • potassium chloride (MICRO-K) 10 MEQ capsule Take 10 mEq by mouth 2 times a day.     • clopidogrel (PLAVIX) 75 MG Tab Take 75 mg by mouth every day.     • ondansetron (ZOFRAN ODT) 4 MG TABLET DISPERSIBLE Take 8 mg by mouth every 8 hours as needed for Nausea/Vomiting.     • econazole nitrate 1 % cream Apply  to affected area(s) 2 times a day.     • isosorbide mononitrate SR (IMDUR) 30 MG TABLET SR 24 HR Take 1 Tab by mouth every day. (Patient taking differently: Take 60 mg by mouth every day.) 30 Tab 2   • metoprolol SR (TOPROL XL) 25 MG TABLET SR 24 HR Take 1 Tab by mouth every day. 30 Tab 2   • dutasteride (AVODART) 0.5 MG capsule Take 0.5 mg by mouth every day.     • rosuvastatin (CRESTOR) 10 MG Tab Take 40 mg by mouth every evening.     • donepezil (ARICEPT) 10 MG tablet Take 10 mg by mouth every bedtime.     • Memantine HCl ER (NAMENDA XR) 21 MG CAPSULE SR 24 HR Take 21 mg by mouth every bedtime.     • fluoxetine (PROZAC) 20 MG Cap Take 40 mg by mouth every bedtime.     • baclofen (LIORESAL) 20 MG tablet Take 20-40 mg by mouth See Admin Instructions. 20 mg in AM  40 mg at BEDTIME     • omeprazole (PRILOSEC) 20 MG delayed-release capsule Take 20 mg by mouth 2 times a day.     • aspirin EC (ECOTRIN) 81 MG Tablet Delayed Response Take 81 mg by mouth every day.     • Potassium Gluconate 550 MG Tab Take 1 Tab by mouth every day.     • fenofibrate (TRICOR) 145 MG Tab Take 145 mg by mouth every bedtime.     • fluticasone (FLONASE) 50 MCG/ACT nasal spray Spray 1 Spray in nose 3 times a day.       No current facility-administered medications for this visit.        Allergies   Allergen Reactions   • Tape      Paper tape ok,coban ok   • Valium      Psychological changes,'history of addiction to valium'       Blood pressure 120/72, pulse  71, temperature 36.1 °C (97 °F), temperature source Temporal, resp. rate 16, height 1.829 m (6'), weight 97.1 kg (214 lb), SpO2 95 %.    PE:   Appearance: Well developed, well nourished, no acute distress  Eyes: PERRL, EOM intact, sclera white, conjunctiva moist  Ears: no lesions or deformities  Hearing: grossly intact  Nose: no lesions or deformities  Oropharynx: tongue normal, posterior pharynx without erythema or exudate  Mallampati Classification: Class 4  Neck: supple, trachea midline, no masses   Respiratory effort: no intercostal retractions or use of accessory muscles  Lung auscultation: no rales, rhonchi or wheezes  Heart auscultation: no murmur rub or gallop  Extremities: no cyanosis or edema  Abdomen: soft ,non tender, no masses  Gait and Station: Ambulates with walker   Digits and nails: no clubbing, cyanosis, petechiae or nodes.  Cranial nerves: grossly intact  Skin: no rashes, lesions or ulcers noted  Orientation: Oriented to time, person and place  Mood and affect: mood and affect appropriate, normal interaction with examiner            Assessment:    1. JONATHAN (obstructive sleep apnea)  DME Other   2. Chronic obstructive pulmonary disease, unspecified COPD type (HCC)     3. Tobacco dependence     4. Allergic rhinitis, unspecified seasonality, unspecified trigger           Plan:    1) Empirically increase Bipap to 18/14 CM H20.   2) Sleep hygiene discussed.  3) Weight loss recommended.   4) Encourage smoking cessation.  5) Continue Dymista nasal spray.   6) Continue Spiriva and Albuterol inhalers.  7) Patient is up to date on his Prevnar 13, Pneumovax 23 and Influenza vaccinations.  8) Follow up in 3 months with repeat compliance download, sooner OV if needed.

## 2019-01-24 NOTE — PATIENT INSTRUCTIONS
Plan:    1) Empirically increase Bipap to 18/14 CM H20.   2) Sleep hygiene discussed.  3) Weight loss recommended.   4) Encourage smoking cessation.  5) Continue Dymista nasal spray.   6) Continue Spiriva and Albuterol inhalers.  7) Patient is up to date on his Prevnar 13, Pneumovax 23 and Influenza vaccinations.  8) Follow up in 3 months with repeat compliance download, sooner OV if needed.

## 2019-05-01 ENCOUNTER — OFFICE VISIT (OUTPATIENT)
Dept: PULMONOLOGY | Facility: HOSPICE | Age: 74
End: 2019-05-01
Payer: MEDICARE

## 2019-05-01 VITALS
HEIGHT: 72 IN | DIASTOLIC BLOOD PRESSURE: 60 MMHG | WEIGHT: 212 LBS | SYSTOLIC BLOOD PRESSURE: 112 MMHG | BODY MASS INDEX: 28.71 KG/M2 | OXYGEN SATURATION: 94 % | RESPIRATION RATE: 16 BRPM | HEART RATE: 85 BPM

## 2019-05-01 DIAGNOSIS — J44.9 CHRONIC OBSTRUCTIVE PULMONARY DISEASE, UNSPECIFIED COPD TYPE (HCC): ICD-10-CM

## 2019-05-01 DIAGNOSIS — G47.33 OSA (OBSTRUCTIVE SLEEP APNEA): ICD-10-CM

## 2019-05-01 DIAGNOSIS — F17.200 TOBACCO DEPENDENCE: ICD-10-CM

## 2019-05-01 DIAGNOSIS — J30.9 ALLERGIC RHINITIS, UNSPECIFIED SEASONALITY, UNSPECIFIED TRIGGER: ICD-10-CM

## 2019-05-01 PROCEDURE — 99214 OFFICE O/P EST MOD 30 MIN: CPT | Performed by: NURSE PRACTITIONER

## 2019-05-01 NOTE — PROGRESS NOTES
Chief Complaint   Patient presents with   • Apnea   • COPD     Last Seen 1/24/19       HPI:  Al Gillis is a 73 y.o. year old male here today for follow-up on his obstructive sleep apnea and COPD.      Polysomnogram indicated an AHI of 15.3 with a minimum 02 saturation of 85%. He has been compliant with BiPAP 16/11 CM H20. Compliance card download at his last office visit indicated an AHI of 18.9 with an average use of 9 hours at night. He has had a 15 pound weight gain nd felt his pressures were too low. He was empirically increased to 18/14 CM H20. Repeat compliance card download today in the office indicates an AHI of 10.7 with an average use of 8-9 hours at night. He does have evidence of a mask leak. He has a nasal mask. He denies excessive mask leaks. He does feel he may be breathing through his mouth. However he does not tolerate the full face mask. He tolerates the pressure well. He does feel he sleeps better on therapy and wakes more refreshed.   He does have chronic headaches, but does not feel these are sleep related. He has had prior work up for his headaches and they are felt to be related to his chronic neck pain.      He carries a diagnosis of COPD. However Pulmonary function tests from 8/11/16 indicated an FEV1 of 3.19 L, 100% predicted, FEV1/FVC ratio of 74 and a DLCO of 136% predicted. He is compliant with Spiriva 2.5 µg inhalations daily and albuterol HFA as needed. 6 minute walk in the past showed no indication for oxygenation. O2 saturation was a low of 93% the entire study. Due to increased shortness of breath a CTA was obtained 3/28/17 indicating no PE with bibasilar scarring. There is stable benign-appearing cyst in the anterior mediastinum. The patient has a history of tuberculosis and underwent treatment in 1954. He does smoke cigarettes and averages 1 PPD. He is working on quitting. He states he was unsuccessful with smoking cessation in the past as it added to increased anxiety.  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   He does have post nasal drip. He was switched from Flonase to Dymista nasal spray. He notes mild dyspnea with exertion which he feels is unchanged. He denies current cough, mucous or wheeze. He denies any fevers or chills. He denies any recent respiratory infections.          Past Medical History:   Diagnosis Date   • Anesthesia     CONFUSED/FIGHTING FOR 10 DAYS AFTER SURGERY   • Arthritis    • Breath shortness    • Congestive heart failure (HCC)    • Dental disorder     dentures   • Diabetes     on oral meds   • Hypertension    • JONATHAN treated with BiPAP 4/27/2016   • Pacemaker    • Pain     back,legs,arms,pain scale 8   • Pneumonia 2004   • Psychiatric problem    • Shortness of breath 8/11/2016   • Sleep apnea     uses bypap   • Snoring    • Tuberculosis     treated for as a teenager       Past Surgical History:   Procedure Laterality Date   • CARPAL TUNNEL RELEASE  5/2/2013    Performed by Leo Penaloza M.D. at [facility]   • CARPAL TUNNEL RELEASE  4/18/2013    Performed by Leo Penaloza M.D. at SURGERY [facility]   • [facility]  11/30/2010    [facility]   • LUMBAR DECOMPRESSION  1/29/2010    Performed by LEO PENALOZA at [facility]   • LUMBAR LAMINECTOMY DISKECTOMY  1/29/2010    Performed by LEO PENALOZA at [facility]   • FORAMINOTOMY  1/29/2010    Performed by LEO PENALOZA at [facility]   • HIP ARTHROPLASTY TOTAL  10/6/2006    right   • LIPOMA EXCISION  2005   • PB REPAIR OF HAMMERTOE,ONE  2001    right foot   # 2,3,4,5   • OTHER CARDIAC SURGERY  1/22/97    quadruple bypass   • ROTATOR CUFF REPAIR  12/8/06,10/6/07    bilateral,with muscle repair   • STENT PLACEMENT  10/28/19       Family History   Problem Relation Age of Onset   • Cancer Mother        Social History     Social History   • Marital status:      Spouse name:  N/A   • Number of children: N/A   • Years of education: N/A     Occupational History   • Not on file.     Social History Main Topics   • Smoking status: Current Every Day Smoker     Packs/day: 3.00     Years: 44.00     Types: Cigarettes     Last attempt to quit: 1/12/2008   • Smokeless tobacco: Former User     Types: Chew     Quit date: 1/1/1975      Comment: 3 ppd 45 yrs,quit 8/31/08   • Alcohol use No   • Drug use: No   • Sexual activity: Not on file     Other Topics Concern   • Not on file     Social History Narrative   • No narrative on file       ROS:  Constitutional: Denies fevers, chills, sweats, weight loss  Eyes: Denies vision loss, pain, drainage, double vision. Wears glasses   Ears/Nose/Mouth/Throat: Denies ear ache, difficulty hearing, sore throat, persistent hoarseness, decayed teeth/toothache  Cardiovascular: Denies chest pain, tightness, palpitations, swelling in feet/legs, fainting, difficulty breathing when laying down  Respiratory: See HPI   GI: Denies heartburn, difficulty swallowing, nausea, vomiting, abdominal pain, diarrhea, constipation  : Denies frequent urination, painful urination  Integumentary: Denies rashes, lumps or color changes  MSK: Positive for neck pain  Neurological: Denies dizziness, weakness. Positive for headaches   Sleep: See HPI       Current Outpatient Prescriptions   Medication Sig Dispense Refill   • SPIRIVA RESPIMAT 2.5 MCG/ACT Aero Soln INHALE 2 PUFFS BY MOUTH  EVERY DAY. 3 Inhaler 3   • Azelastine-Fluticasone (DYMISTA) 137-50 MCG/ACT Suspension Spray 1 Spray in nose 2 Times a Day. Each nostril. 3 Bottle 3   • ranolazine (RANEXA) 500 MG TABLET SR 12 HR Take 500 mg by mouth 2 times a day.     • ipratropium (ATROVENT) 0.03 % Solution Spray 2 Sprays in nose every 12 hours. 90 mL 3   • Buprenorphine 15 MCG/HR PATCH WEEKLY   0   • Diclofenac Sodium 1 % Gel every day.  0   • gabapentin (NEURONTIN) 300 MG Cap   0   • potassium chloride (MICRO-K) 10 MEQ capsule Take 10 mEq by  mouth 2 times a day.     • clopidogrel (PLAVIX) 75 MG Tab Take 75 mg by mouth every day.     • ondansetron (ZOFRAN ODT) 4 MG TABLET DISPERSIBLE Take 8 mg by mouth every 8 hours as needed for Nausea/Vomiting.     • econazole nitrate 1 % cream Apply  to affected area(s) 2 times a day.     • isosorbide mononitrate SR (IMDUR) 30 MG TABLET SR 24 HR Take 1 Tab by mouth every day. (Patient taking differently: Take 60 mg by mouth every day.) 30 Tab 2   • metoprolol SR (TOPROL XL) 25 MG TABLET SR 24 HR Take 1 Tab by mouth every day. 30 Tab 2   • dutasteride (AVODART) 0.5 MG capsule Take 0.5 mg by mouth every day.     • rosuvastatin (CRESTOR) 10 MG Tab Take 40 mg by mouth every evening.     • donepezil (ARICEPT) 10 MG tablet Take 10 mg by mouth every bedtime.     • Memantine HCl ER (NAMENDA XR) 21 MG CAPSULE SR 24 HR Take 21 mg by mouth every bedtime.     • fluoxetine (PROZAC) 20 MG Cap Take 40 mg by mouth every bedtime.     • baclofen (LIORESAL) 20 MG tablet Take 20-40 mg by mouth See Admin Instructions. 20 mg in AM  40 mg at BEDTIME     • omeprazole (PRILOSEC) 20 MG delayed-release capsule Take 20 mg by mouth 2 times a day.     • aspirin EC (ECOTRIN) 81 MG Tablet Delayed Response Take 81 mg by mouth every day.     • PROAIR  (90 Base) MCG/ACT Aero Soln inhalation aerosol INHALE 2 PUFFS BY MOUTH  EVERY FOUR HOURS AS NEEDED  FOR SHORTNESS OF BREATH. 3 Inhaler 3   • fenofibrate (TRICOR) 145 MG Tab Take 145 mg by mouth every bedtime.     • fluticasone (FLONASE) 50 MCG/ACT nasal spray Spray 1 Spray in nose 3 times a day.     • Potassium Gluconate 550 MG Tab Take 1 Tab by mouth every day.       No current facility-administered medications for this visit.        Allergies   Allergen Reactions   • Tape      Paper tape ok,coban ok   • Valium      Psychological changes,'history of addiction to valium'       /60 (BP Location: Left arm, Patient Position: Sitting, BP Cuff Size: Adult)   Pulse 85   Resp 16   Ht 1.829 m  (6')   Wt 96.2 kg (212 lb)   SpO2 94%     PE:   Appearance: Well developed, well nourished, no acute distress  Eyes: PERRL, EOM intact, sclera white, conjunctiva moist  Ears: no lesions or deformities  Hearing: grossly intact  Nose: no lesions or deformities  Oropharynx: tongue normal, posterior pharynx without erythema or exudate  Mallampati Classification: Class 4   Neck: supple, trachea midline, no masses   Respiratory effort: no intercostal retractions or use of accessory muscles  Lung auscultation: no rales, rhonchi or wheezes  Heart auscultation: no murmur rub or gallop  Extremities: no cyanosis or edema  Abdomen: soft ,non tender, no masses  Gait and Station: normal  Digits and nails: no clubbing, cyanosis, petechiae or nodes.  Cranial nerves: grossly intact  Skin: no rashes, lesions or ulcers noted  Orientation: Oriented to time, person and place  Mood and affect: mood and affect appropriate, normal interaction with examiner  Judgement: Intact          Assessment:    1. JONATHAN (obstructive sleep apnea)  DME Mask and Supplies   2. Chronic obstructive pulmonary disease, unspecified COPD type (HCC)  PULMONARY FUNCTION TESTS -Test requested: Complete Pulmonary Function Test   3. Tobacco dependence     4. Allergic rhinitis, unspecified seasonality, unspecified trigger           Plan:    1) Continue Bipap to 18/14 CM H20. He does not feel he can tolerate a full face mask. Order for new nasal mask. Add chin strap to fit.   2) Sleep hygiene discussed.  3) Weight loss recommended.   4) Encourage smoking cessation. He states he has not smoked today and is working on quitting.   5) Continue Dymista nasal spray.   6) Continue Spiriva and Albuterol inhalers.  7) Patient is up to date on his Prevnar 13, Pneumovax 23 and Influenza vaccinations.  8) Follow up in 6 months with updated PFT's and repeat compliance card download, sooner OV if needed.

## 2019-05-01 NOTE — PATIENT INSTRUCTIONS
Plan:    1) Continue Bipap to 18/14 CM H20. He does not feel he can tolerate a full face mask. Order for new nasal mask. Add chin strap to fit.   2) Sleep hygiene discussed.  3) Weight loss recommended.   4) Encourage smoking cessation. He states he has not smoked today and is working on quitting.   5) Continue Dymista nasal spray.   6) Continue Spiriva and Albuterol inhalers.  7) Patient is up to date on his Prevnar 13, Pneumovax 23 and Influenza vaccinations.  8) Follow up in 6 months with updated PFT's and repeat compliance card download, sooner OV if needed.

## 2019-07-08 NOTE — TELEPHONE ENCOUNTER
Have we ever prescribed this med? Yes.  If yes, what date? 01/04/2018    Last OV: 05/01/2019 - Gemini Collins    Next OV: 11/06/2019 - Gemini Collins    DX: COPD    Medications: Atrovent

## 2019-11-06 NOTE — PROCEDURES
Technician: JAMILAH Coronado    Technician Comment:  Good patient effort & cooperation.  The results of this test meet the ATS/ERS standards for acceptability & reproducibility.  Test was performed on the jaeyos Body Plethysmograph-Elite DX system.  Predicted values were [facility]-3 for spirometry, [facility] for DLCO, ITS for Lung Volumes.  The DLCO was uncorrected for Hgb.  A bronchodilator of Ventolin HFA -2puffs via spacer administered.  DLCO performed during dilation period.    Interpretation:  Spirometry showed FVC of 4.13 L, 97% predicted.  FEV1 was 3.22 L, 105% predicted.  FEV1/FVC ratio was 78%.  There was no significant response of bronchodilators.  Flow volume loop has normal shape and size.    Lung volumes showed mild air trapping with residual volume of 121% of predicted.  Total lung capacity was 101% predicted.    Diffusion capacity was normal at 111% predicted.    Impression:  The patient had minimal air trapping with residual volume of 121% predicted.  Otherwise the patient had normal pulmonary function test.  The test findings are not consistent with a patient listed diagnosis of COPD.  Clinical correlation is required.

## 2019-11-06 NOTE — PROGRESS NOTES
CC: Sinus congestion    HPI:  Al Gillis is a 74 y.o. year old male here today for follow-up on COPD and sleep apnea. Last seen in clinic 5/1/2019 by VIRGIL Soto.  He reports being a former smoker with greater than 100-pack-year history and quit date in 2008.  There is mention in Gemini's last office visit of continued smoking.  Continued abstinence advised.    Pertinent past medical history includes CABG x4, JONATHAN with BiPAP and patient stated understanding of need to follow-up at sleep center, pacemaker, pneumonia, TB, Alzheimer's.     Reviewed in clinic vitals including blood pressure 110/74, heart rate of 60, O2 sat of 96%. Patient's body mass index is 30.85 kg/m². Exercise and nutrition counseling were performed at this visit.  Discussion included impact of central adiposity on pulmonary function.    Reviewed home medication regimen including Spiriva, Dymista, albuterol, clopidogrel, lansoprazole, Flonase nasal spray. Reports current Pro Air use at 3-4 times per day.    Reviewed most recent imaging including CTA chest 2017 demonstrating no PE, bibasilar scarring.    Briefly reviewed compliance report including 30 out of 30 days usage, 28 days greater than 4 hours, average usage 8 hours and 58 minutes, AHI 9.3, 18/14 cmH2O pressure.  See media for full report.  Patient does have nasal and sinus congestion which may be aggravated by the CPAP refills.  Discussed Neti pot and sinus rinses to help with this.    Reviewed pulmonary function testing obtained today as compared to 2016 demonstrating an FEV1 of 3.08 L or 100% predicted was 111%, FVC of 4.17 L or 98% predicted was 102% prior, FEV1/FVC ratio 74 was 79 prior, no significant bronchodilator response, residual volume 121% predicted was 120, total lung capacity 101% predicted was 110, DLCO 111% predicted was 136%.    Review of Systems   Constitutional: Positive for malaise/fatigue. Negative for chills, diaphoresis, fever and weight loss.    HENT: Positive for congestion, hearing loss, sinus pain, sore throat (sometimes) and tinnitus. Negative for nosebleeds.    Eyes: Negative.         Prescription eyeglasses    Respiratory: Positive for cough, shortness of breath and wheezing. Negative for sputum production.    Cardiovascular: Positive for chest pain (left lateral ). Negative for palpitations, orthopnea, claudication and leg swelling.   Gastrointestinal: Positive for heartburn (no break thru on medication).        Upper and lower dentures, no difficulty swallowing    Skin: Negative.    Neurological: Positive for dizziness (occasional on standing ) and headaches. Negative for tremors.   Psychiatric/Behavioral: The patient has insomnia (not with medication ).        Past Medical History:   Diagnosis Date   • Anesthesia     CONFUSED/FIGHTING FOR 10 DAYS AFTER SURGERY   • Arthritis    • Breath shortness    • Congestive heart failure (HCC)    • Dental disorder     dentures   • Diabetes     on oral meds   • Hypertension    • JONATHAN treated with BiPAP 4/27/2016   • Pacemaker    • Pain     back,legs,arms,pain scale 8   • Pneumonia 2004   • Psychiatric problem    • Shortness of breath 8/11/2016   • Sleep apnea     uses bypap   • Snoring    • Tuberculosis     treated for as a teenager       Past Surgical History:   Procedure Laterality Date   • CARPAL TUNNEL RELEASE  5/2/2013    Performed by Leo Penaloza M.D. at [facility]   • CARPAL TUNNEL RELEASE  4/18/2013    Performed by Leo Penaloza M.D. at [facility]   • [facility]  11/30/2010    [facility]   • LUMBAR DECOMPRESSION  1/29/2010    Performed by LEO PENALOZA at [facility]   • LUMBAR LAMINECTOMY DISKECTOMY  1/29/2010    Performed by LEO PE[facility]LOZA at [facility]   • FORAMINOTOMY  1/29/2010    Performed by LEO PENALOZA at [facility]   • HIP ARTHROPLASTY TOTAL  10/6/2006    right   •  LIPOMA EXCISION     • PB REPAIR OF HAMMERTOE,ONE      right foot   # 2,3,4,5   • OTHER CARDIAC SURGERY  97    quadruple bypass   • ROTATOR CUFF REPAIR  06,10/6/07    bilateral,with muscle repair   • STENT PLACEMENT  10/28/19       Family History   Problem Relation Age of Onset   • Cancer Mother        Social History     Socioeconomic History   • Marital status:      Spouse name: Not on file   • Number of children: Not on file   • Years of education: Not on file   • Highest education level: Not on file   Occupational History   • Not on file   Social Needs   • Financial resource strain: Not on file   • Food insecurity:     Worry: Not on file     Inability: Not on file   • Transportation needs:     Medical: Not on file     Non-medical: Not on file   Tobacco Use   • Smoking status: Former Smoker     Packs/day: 3.00     Years: 44.00     Pack years: 132.00     Types: Cigarettes     Last attempt to quit: 2008     Years since quittin.8   • Smokeless tobacco: Former User     Types: Chew     Quit date: 1975   • Tobacco comment: 3 ppd 45 yrs,quit 08   Substance and Sexual Activity   • Alcohol use: No     Alcohol/week: 0.0 oz   • Drug use: No   • Sexual activity: Not on file   Lifestyle   • Physical activity:     Days per week: Not on file     Minutes per session: Not on file   • Stress: Not on file   Relationships   • Social connections:     Talks on phone: Not on file     Gets together: Not on file     Attends [employer] service: Not on file     Active member of club or organization: Not on file     Attends meetings of clubs or organizations: Not on file     Relationship status: Not on file   • Intimate partner violence:     Fear of current or ex partner: Not on file     Emotionally abused: Not on file     Physically abused: Not on file     Forced sexual activity: Not on file   Other Topics Concern   • Not on file   Social History Narrative   • Not on file       Allergies as of  "11/06/2019 - Reviewed 11/06/2019   Allergen Reaction Noted   • Tape  01/29/2010   • Valium  12/18/2008        @Vital signs for this encounter:  Vitals:    11/06/19 1254   Height: 1.778 m (5' 10\")   Weight: 97.5 kg (215 lb)   Weight % change since last entry.: 0 %   BP: 110/74   Pulse: 60   BMI (Calculated): 30.85   Resp: 16       Current medications as of today   Current Outpatient Medications   Medication Sig Dispense Refill   • AMLODIPINE-ATORVASTATIN PO Take 2.5 mg by mouth.     • lansoprazole (PREVACID) 30 MG TABLET DISPERSIBLE Take 30 mg by mouth every day.     • ipratropium (ATROVENT) 0.03 % Solution USE 2 SPRAYS NASALLY EVERY  12 HOURS 1 Bottle 5   • SPIRIVA RESPIMAT 2.5 MCG/ACT Aero Soln INHALE 2 PUFFS BY MOUTH  EVERY DAY. 3 Inhaler 3   • Azelastine-Fluticasone (DYMISTA) 137-50 MCG/ACT Suspension Spray 1 Spray in nose 2 Times a Day. Each nostril. 3 Bottle 3   • PROAIR  (90 Base) MCG/ACT Aero Soln inhalation aerosol INHALE 2 PUFFS BY MOUTH  EVERY FOUR HOURS AS NEEDED  FOR SHORTNESS OF BREATH. 3 Inhaler 3   • ranolazine (RANEXA) 500 MG TABLET SR 12 HR Take 500 mg by mouth 2 times a day.     • Buprenorphine 15 MCG/HR PATCH WEEKLY   0   • Diclofenac Sodium 1 % Gel every day.  0   • gabapentin (NEURONTIN) 300 MG Cap   0   • clopidogrel (PLAVIX) 75 MG Tab Take 75 mg by mouth every day.     • ondansetron (ZOFRAN ODT) 4 MG TABLET DISPERSIBLE Take 8 mg by mouth every 8 hours as needed for Nausea/Vomiting.     • econazole nitrate 1 % cream Apply  to affected area(s) 2 times a day.     • metoprolol SR (TOPROL XL) 25 MG TABLET SR 24 HR Take 1 Tab by mouth every day. 30 Tab 2   • rosuvastatin (CRESTOR) 10 MG Tab Take 40 mg by mouth every evening.     • donepezil (ARICEPT) 10 MG tablet Take 10 mg by mouth every bedtime.     • Memantine HCl ER (NAMENDA XR) 21 MG CAPSULE SR 24 HR Take 21 mg by mouth every bedtime.     • fluoxetine (PROZAC) 20 MG Cap Take 40 mg by mouth every bedtime.     • baclofen (LIORESAL) 20 MG " tablet Take 20-40 mg by mouth See Admin Instructions. 20 mg in AM  40 mg at BEDTIME     • fluticasone (FLONASE) 50 MCG/ACT nasal spray Spray 1 Spray in nose 3 times a day.     • aspirin EC (ECOTRIN) 81 MG Tablet Delayed Response Take 81 mg by mouth every day.     • Potassium Gluconate 550 MG Tab Take 1 Tab by mouth every day.     • potassium chloride (MICRO-K) 10 MEQ capsule Take 10 mEq by mouth 2 times a day.     • isosorbide mononitrate SR (IMDUR) 30 MG TABLET SR 24 HR Take 1 Tab by mouth every day. (Patient taking differently: Take 60 mg by mouth every day.) 30 Tab 2   • dutasteride (AVODART) 0.5 MG capsule Take 0.5 mg by mouth every day.     • fenofibrate (TRICOR) 145 MG Tab Take 145 mg by mouth every bedtime.     • omeprazole (PRILOSEC) 20 MG delayed-release capsule Take 20 mg by mouth 2 times a day.       No current facility-administered medications for this visit.          Physical Exam:   Gen:           Alert and oriented, No apparent distress. Mood and affect     appropriate, normal interaction with provider.  Eyes:          sclere white, conjunctive moist.  Hearing:     Grossly intact.  Dentition:    Good dentition.  Oropharynx:   Tongue normal, posterior pharynx without erythema or exudate.  Neck:        Supple, trachea midline, no masses.  Respiratory Effort: No intercostal retractions or use of accessory muscles.   Lung Auscultation:      Clear to auscultation bilaterally; no rales, rhonchi or wheezing.  CV:            Regular rate and rhythm. No edema. No murmurs, rubs or gallops.  Digits, Nails, Ext: No clubbing, cyanosis, petechiae, or nodes.   Skin:        No rashes, lesions or ulcers noted on back or exposed skin    surfaces.                     Assessment:  1. Chronic obstructive pulmonary disease, unspecified COPD type (HCC)  Tiotropium Bromide Monohydrate (SPIRIVA RESPIMAT) 2.5 MCG/ACT Aero Soln    albuterol (PROAIR HFA) 108 (90 Base) MCG/ACT Aero Soln inhalation aerosol   2. Allergic rhinitis,  unspecified seasonality, unspecified trigger  Azelastine-Fluticasone (DYMISTA) 137-50 MCG/ACT Suspension   3. Shortness of breath  albuterol (PROAIR HFA) 108 (90 Base) MCG/ACT Aero Soln inhalation aerosol       Immunizations:    Flu: 10/9/2019  Pneumovax 23: 3/16/2017  Prevnar 13: 10/13/2016    Plan:    1-needs sleep appointment, see compliance report in media  2-reviewed PFT today consider 2021  3-renewed prescriptions  4-no new pulmonary concerns  5-follow up in 6 months, sooner if needed     This dictation was created using voice recognition software. The accuracy of the dictation is limited to the abilities of the software. I expect there may be some errors of grammar and possibly content.

## 2019-11-06 NOTE — PATIENT INSTRUCTIONS
1-needs sleep appointment, see compliance report in media  2-reviewed PFT today consider 2021  3-renewed prescriptions  4-no new pulmonary concerns  5-follow up in 6 months, sooner if needed

## 2019-12-26 NOTE — TELEPHONE ENCOUNTER
Pedro from [facility] calling to verify patient using Atrovent nasal spray and dymista. Patient alternates nasal sprays. Pharmacy to keep both on hand and will fill as requested. These nasal sprays are in different categories. I did check with a provider.